# Patient Record
Sex: MALE | Race: WHITE | NOT HISPANIC OR LATINO | Employment: OTHER | URBAN - METROPOLITAN AREA
[De-identification: names, ages, dates, MRNs, and addresses within clinical notes are randomized per-mention and may not be internally consistent; named-entity substitution may affect disease eponyms.]

---

## 2023-09-02 ENCOUNTER — HOSPITAL ENCOUNTER (INPATIENT)
Facility: HOSPITAL | Age: 58
LOS: 3 days | Discharge: HOME | DRG: 392 | End: 2023-09-05
Attending: EMERGENCY MEDICINE | Admitting: SURGERY

## 2023-09-02 DIAGNOSIS — K57.92 DIVERTICULITIS: ICD-10-CM

## 2023-09-02 DIAGNOSIS — R10.9 ABDOMINAL PAIN: ICD-10-CM

## 2023-09-02 DIAGNOSIS — D72.829 LEUKOCYTOSIS, UNSPECIFIED TYPE: ICD-10-CM

## 2023-09-02 DIAGNOSIS — K57.80 PERFORATED DIVERTICULUM OF INTESTINE: Primary | ICD-10-CM

## 2023-09-02 DIAGNOSIS — N28.9 ACUTE RENAL INSUFFICIENCY: ICD-10-CM

## 2023-09-02 DIAGNOSIS — R73.9 HYPERGLYCEMIA: ICD-10-CM

## 2023-09-02 LAB
ALBUMIN SERPL-MCNC: 3.9 G/DL (ref 3.5–5)
ALBUMIN/GLOB SERPL: 1.3 RATIO (ref 1.1–2)
ALP SERPL-CCNC: 61 UNIT/L (ref 40–150)
ALT SERPL-CCNC: 22 UNIT/L (ref 0–55)
APPEARANCE UR: CLEAR
APTT PPP: 26.2 SECONDS (ref 23.2–33.7)
AST SERPL-CCNC: 23 UNIT/L (ref 5–34)
BACTERIA #/AREA URNS AUTO: ABNORMAL /HPF
BASOPHILS # BLD AUTO: 0.02 X10(3)/MCL
BASOPHILS NFR BLD AUTO: 0.1 %
BILIRUB SERPL-MCNC: 0.8 MG/DL
BILIRUB UR QL STRIP.AUTO: NEGATIVE
BUN SERPL-MCNC: 19.5 MG/DL (ref 8.4–25.7)
CALCIUM SERPL-MCNC: 9.8 MG/DL (ref 8.4–10.2)
CHLORIDE SERPL-SCNC: 108 MMOL/L (ref 98–107)
CO2 SERPL-SCNC: 25 MMOL/L (ref 22–29)
COLOR UR: ABNORMAL
CREAT SERPL-MCNC: 1.65 MG/DL (ref 0.73–1.18)
EOSINOPHIL # BLD AUTO: 0 X10(3)/MCL (ref 0–0.9)
EOSINOPHIL NFR BLD AUTO: 0 %
ERYTHROCYTE [DISTWIDTH] IN BLOOD BY AUTOMATED COUNT: 13.7 % (ref 11.5–17)
EST. AVERAGE GLUCOSE BLD GHB EST-MCNC: 122.6 MG/DL
GFR SERPLBLD CREATININE-BSD FMLA CKD-EPI: 48 MLS/MIN/1.73/M2
GLOBULIN SER-MCNC: 3 GM/DL (ref 2.4–3.5)
GLUCOSE SERPL-MCNC: 206 MG/DL (ref 74–100)
GLUCOSE UR QL STRIP.AUTO: NORMAL
HBA1C MFR BLD: 5.9 %
HCT VFR BLD AUTO: 43.1 % (ref 42–52)
HGB BLD-MCNC: 14.7 G/DL (ref 14–18)
HYALINE CASTS #/AREA URNS LPF: ABNORMAL /LPF
IMM GRANULOCYTES # BLD AUTO: 0.1 X10(3)/MCL (ref 0–0.04)
IMM GRANULOCYTES NFR BLD AUTO: 0.5 %
INR PPP: 1
KETONES UR QL STRIP.AUTO: NEGATIVE
LACTATE SERPL-SCNC: 1.7 MMOL/L (ref 0.5–2.2)
LACTATE SERPL-SCNC: 2.3 MMOL/L (ref 0.5–2.2)
LEUKOCYTE ESTERASE UR QL STRIP.AUTO: NEGATIVE
LIPASE SERPL-CCNC: 25 U/L
LYMPHOCYTES # BLD AUTO: 0.64 X10(3)/MCL (ref 0.6–4.6)
LYMPHOCYTES NFR BLD AUTO: 3.4 %
MCH RBC QN AUTO: 30.3 PG (ref 27–31)
MCHC RBC AUTO-ENTMCNC: 34.1 G/DL (ref 33–36)
MCV RBC AUTO: 88.9 FL (ref 80–94)
MONOCYTES # BLD AUTO: 0.63 X10(3)/MCL (ref 0.1–1.3)
MONOCYTES NFR BLD AUTO: 3.3 %
MUCOUS THREADS URNS QL MICRO: ABNORMAL /LPF
NEUTROPHILS # BLD AUTO: 17.43 X10(3)/MCL (ref 2.1–9.2)
NEUTROPHILS NFR BLD AUTO: 92.7 %
NITRITE UR QL STRIP.AUTO: NEGATIVE
NRBC BLD AUTO-RTO: 0 %
PH UR STRIP.AUTO: 5.5 [PH]
PLATELET # BLD AUTO: 253 X10(3)/MCL (ref 130–400)
PMV BLD AUTO: 9.7 FL (ref 7.4–10.4)
POTASSIUM SERPL-SCNC: 3.8 MMOL/L (ref 3.5–5.1)
PROT SERPL-MCNC: 6.9 GM/DL (ref 6.4–8.3)
PROT UR QL STRIP.AUTO: ABNORMAL
PROTHROMBIN TIME: 13.4 SECONDS (ref 11.4–14)
RBC # BLD AUTO: 4.85 X10(6)/MCL (ref 4.7–6.1)
RBC #/AREA URNS AUTO: ABNORMAL /HPF
RBC UR QL AUTO: NEGATIVE
SODIUM SERPL-SCNC: 141 MMOL/L (ref 136–145)
SP GR UR STRIP.AUTO: 1.03 (ref 1–1.03)
SQUAMOUS #/AREA URNS LPF: ABNORMAL /HPF
UROBILINOGEN UR STRIP-ACNC: NORMAL
WBC # SPEC AUTO: 18.82 X10(3)/MCL (ref 4.5–11.5)
WBC #/AREA URNS AUTO: ABNORMAL /HPF

## 2023-09-02 PROCEDURE — 85730 THROMBOPLASTIN TIME PARTIAL: CPT | Performed by: EMERGENCY MEDICINE

## 2023-09-02 PROCEDURE — 96361 HYDRATE IV INFUSION ADD-ON: CPT

## 2023-09-02 PROCEDURE — 96365 THER/PROPH/DIAG IV INF INIT: CPT

## 2023-09-02 PROCEDURE — 85025 COMPLETE CBC W/AUTO DIFF WBC: CPT | Performed by: EMERGENCY MEDICINE

## 2023-09-02 PROCEDURE — 93005 ELECTROCARDIOGRAM TRACING: CPT

## 2023-09-02 PROCEDURE — 80053 COMPREHEN METABOLIC PANEL: CPT | Performed by: EMERGENCY MEDICINE

## 2023-09-02 PROCEDURE — 96366 THER/PROPH/DIAG IV INF ADDON: CPT

## 2023-09-02 PROCEDURE — 63600175 PHARM REV CODE 636 W HCPCS: Performed by: INTERNAL MEDICINE

## 2023-09-02 PROCEDURE — 81001 URINALYSIS AUTO W/SCOPE: CPT | Performed by: EMERGENCY MEDICINE

## 2023-09-02 PROCEDURE — 83036 HEMOGLOBIN GLYCOSYLATED A1C: CPT | Performed by: EMERGENCY MEDICINE

## 2023-09-02 PROCEDURE — 83605 ASSAY OF LACTIC ACID: CPT | Performed by: EMERGENCY MEDICINE

## 2023-09-02 PROCEDURE — 25000003 PHARM REV CODE 250: Performed by: INTERNAL MEDICINE

## 2023-09-02 PROCEDURE — 21400001 HC TELEMETRY ROOM

## 2023-09-02 PROCEDURE — 99285 EMERGENCY DEPT VISIT HI MDM: CPT | Mod: 25

## 2023-09-02 PROCEDURE — 25500020 PHARM REV CODE 255: Performed by: INTERNAL MEDICINE

## 2023-09-02 PROCEDURE — 63600175 PHARM REV CODE 636 W HCPCS

## 2023-09-02 PROCEDURE — 63600175 PHARM REV CODE 636 W HCPCS: Performed by: STUDENT IN AN ORGANIZED HEALTH CARE EDUCATION/TRAINING PROGRAM

## 2023-09-02 PROCEDURE — 83690 ASSAY OF LIPASE: CPT | Performed by: EMERGENCY MEDICINE

## 2023-09-02 PROCEDURE — 25000003 PHARM REV CODE 250

## 2023-09-02 PROCEDURE — 63600175 PHARM REV CODE 636 W HCPCS: Performed by: EMERGENCY MEDICINE

## 2023-09-02 PROCEDURE — 87040 BLOOD CULTURE FOR BACTERIA: CPT | Performed by: INTERNAL MEDICINE

## 2023-09-02 PROCEDURE — 85610 PROTHROMBIN TIME: CPT | Performed by: EMERGENCY MEDICINE

## 2023-09-02 RX ORDER — METRONIDAZOLE 500 MG/100ML
500 INJECTION, SOLUTION INTRAVENOUS
Status: DISCONTINUED | OUTPATIENT
Start: 2023-09-02 | End: 2023-09-04

## 2023-09-02 RX ORDER — ACETAMINOPHEN 325 MG/1
650 TABLET ORAL EVERY 8 HOURS PRN
Status: DISCONTINUED | OUTPATIENT
Start: 2023-09-02 | End: 2023-09-05 | Stop reason: HOSPADM

## 2023-09-02 RX ORDER — CIPROFLOXACIN 2 MG/ML
400 INJECTION, SOLUTION INTRAVENOUS
Status: DISCONTINUED | OUTPATIENT
Start: 2023-09-02 | End: 2023-09-04

## 2023-09-02 RX ORDER — METOPROLOL SUCCINATE 25 MG/1
25 TABLET, EXTENDED RELEASE ORAL DAILY
COMMUNITY

## 2023-09-02 RX ORDER — ALLOPURINOL 300 MG/1
300 TABLET ORAL DAILY
COMMUNITY

## 2023-09-02 RX ORDER — MORPHINE SULFATE 2 MG/ML
2 INJECTION, SOLUTION INTRAMUSCULAR; INTRAVENOUS
Status: DISCONTINUED | OUTPATIENT
Start: 2023-09-02 | End: 2023-09-05 | Stop reason: HOSPADM

## 2023-09-02 RX ORDER — FLUOXETINE 20 MG/1
20 TABLET ORAL DAILY
Status: DISCONTINUED | OUTPATIENT
Start: 2023-09-02 | End: 2023-09-05 | Stop reason: HOSPADM

## 2023-09-02 RX ORDER — SODIUM CHLORIDE, SODIUM LACTATE, POTASSIUM CHLORIDE, CALCIUM CHLORIDE 600; 310; 30; 20 MG/100ML; MG/100ML; MG/100ML; MG/100ML
INJECTION, SOLUTION INTRAVENOUS CONTINUOUS
Status: DISCONTINUED | OUTPATIENT
Start: 2023-09-02 | End: 2023-09-05

## 2023-09-02 RX ORDER — FLUOXETINE 20 MG/1
20 TABLET ORAL DAILY
COMMUNITY

## 2023-09-02 RX ORDER — LISINOPRIL AND HYDROCHLOROTHIAZIDE 12.5; 2 MG/1; MG/1
1 TABLET ORAL DAILY
COMMUNITY

## 2023-09-02 RX ORDER — SODIUM CHLORIDE, SODIUM LACTATE, POTASSIUM CHLORIDE, CALCIUM CHLORIDE 600; 310; 30; 20 MG/100ML; MG/100ML; MG/100ML; MG/100ML
1000 INJECTION, SOLUTION INTRAVENOUS
Status: COMPLETED | OUTPATIENT
Start: 2023-09-02 | End: 2023-09-02

## 2023-09-02 RX ORDER — ACETAMINOPHEN 325 MG/1
650 TABLET ORAL EVERY 6 HOURS
Status: DISCONTINUED | OUTPATIENT
Start: 2023-09-02 | End: 2023-09-05 | Stop reason: HOSPADM

## 2023-09-02 RX ORDER — LIDOCAINE HYDROCHLORIDE 10 MG/ML
1 INJECTION, SOLUTION EPIDURAL; INFILTRATION; INTRACAUDAL; PERINEURAL ONCE AS NEEDED
Status: DISCONTINUED | OUTPATIENT
Start: 2023-09-02 | End: 2023-09-05 | Stop reason: HOSPADM

## 2023-09-02 RX ORDER — PROMETHAZINE HYDROCHLORIDE 25 MG/1
25 TABLET ORAL EVERY 6 HOURS PRN
Status: DISCONTINUED | OUTPATIENT
Start: 2023-09-02 | End: 2023-09-05 | Stop reason: HOSPADM

## 2023-09-02 RX ORDER — MELOXICAM 15 MG/1
15 TABLET ORAL DAILY
Status: ON HOLD | COMMUNITY
End: 2023-09-05 | Stop reason: HOSPADM

## 2023-09-02 RX ORDER — ACETAMINOPHEN 325 MG/1
650 TABLET ORAL EVERY 4 HOURS PRN
Status: DISCONTINUED | OUTPATIENT
Start: 2023-09-02 | End: 2023-09-02

## 2023-09-02 RX ORDER — ALLOPURINOL 100 MG/1
300 TABLET ORAL DAILY
Status: DISCONTINUED | OUTPATIENT
Start: 2023-09-02 | End: 2023-09-05 | Stop reason: HOSPADM

## 2023-09-02 RX ORDER — SIMVASTATIN 80 MG/1
80 TABLET, FILM COATED ORAL NIGHTLY
COMMUNITY
End: 2023-09-02

## 2023-09-02 RX ORDER — METOPROLOL SUCCINATE 25 MG/1
25 TABLET, EXTENDED RELEASE ORAL DAILY
Status: DISCONTINUED | OUTPATIENT
Start: 2023-09-02 | End: 2023-09-05 | Stop reason: HOSPADM

## 2023-09-02 RX ORDER — TALC
6 POWDER (GRAM) TOPICAL NIGHTLY PRN
Status: DISCONTINUED | OUTPATIENT
Start: 2023-09-02 | End: 2023-09-05 | Stop reason: HOSPADM

## 2023-09-02 RX ORDER — ENOXAPARIN SODIUM 100 MG/ML
40 INJECTION SUBCUTANEOUS EVERY 24 HOURS
Status: DISCONTINUED | OUTPATIENT
Start: 2023-09-02 | End: 2023-09-02

## 2023-09-02 RX ORDER — OXYCODONE HYDROCHLORIDE 5 MG/1
5 TABLET ORAL EVERY 4 HOURS PRN
Status: DISCONTINUED | OUTPATIENT
Start: 2023-09-02 | End: 2023-09-05 | Stop reason: HOSPADM

## 2023-09-02 RX ORDER — HEPARIN SODIUM 5000 [USP'U]/ML
5000 INJECTION, SOLUTION INTRAVENOUS; SUBCUTANEOUS EVERY 8 HOURS
Status: DISCONTINUED | OUTPATIENT
Start: 2023-09-02 | End: 2023-09-05 | Stop reason: HOSPADM

## 2023-09-02 RX ORDER — SODIUM CHLORIDE 0.9 % (FLUSH) 0.9 %
10 SYRINGE (ML) INJECTION
Status: DISCONTINUED | OUTPATIENT
Start: 2023-09-02 | End: 2023-09-05 | Stop reason: HOSPADM

## 2023-09-02 RX ORDER — ONDANSETRON 4 MG/1
8 TABLET, ORALLY DISINTEGRATING ORAL EVERY 8 HOURS PRN
Status: DISCONTINUED | OUTPATIENT
Start: 2023-09-02 | End: 2023-09-05 | Stop reason: HOSPADM

## 2023-09-02 RX ADMIN — ACETAMINOPHEN 650 MG: 325 TABLET, FILM COATED ORAL at 03:09

## 2023-09-02 RX ADMIN — SODIUM CHLORIDE, POTASSIUM CHLORIDE, SODIUM LACTATE AND CALCIUM CHLORIDE 1000 ML: 600; 310; 30; 20 INJECTION, SOLUTION INTRAVENOUS at 06:09

## 2023-09-02 RX ADMIN — SODIUM CHLORIDE, POTASSIUM CHLORIDE, SODIUM LACTATE AND CALCIUM CHLORIDE: 600; 310; 30; 20 INJECTION, SOLUTION INTRAVENOUS at 02:09

## 2023-09-02 RX ADMIN — OXYCODONE HYDROCHLORIDE 5 MG: 5 TABLET ORAL at 09:09

## 2023-09-02 RX ADMIN — METOPROLOL SUCCINATE 25 MG: 25 TABLET, EXTENDED RELEASE ORAL at 03:09

## 2023-09-02 RX ADMIN — ALLOPURINOL 300 MG: 100 TABLET ORAL at 03:09

## 2023-09-02 RX ADMIN — OXYCODONE HYDROCHLORIDE 5 MG: 5 TABLET ORAL at 04:09

## 2023-09-02 RX ADMIN — METRONIDAZOLE 500 MG: 5 INJECTION, SOLUTION INTRAVENOUS at 09:09

## 2023-09-02 RX ADMIN — FLUOXETINE 20 MG: 20 TABLET, FILM COATED ORAL at 05:09

## 2023-09-02 RX ADMIN — PIPERACILLIN AND TAZOBACTAM 4.5 G: 4; .5 INJECTION, POWDER, LYOPHILIZED, FOR SOLUTION INTRAVENOUS; PARENTERAL at 07:09

## 2023-09-02 RX ADMIN — METRONIDAZOLE 500 MG: 5 INJECTION, SOLUTION INTRAVENOUS at 03:09

## 2023-09-02 RX ADMIN — HEPARIN SODIUM 5000 UNITS: 5000 INJECTION, SOLUTION INTRAVENOUS; SUBCUTANEOUS at 09:09

## 2023-09-02 RX ADMIN — IOHEXOL 100 ML: 350 INJECTION, SOLUTION INTRAVENOUS at 07:09

## 2023-09-02 RX ADMIN — CIPROFLOXACIN 400 MG: 2 INJECTION, SOLUTION INTRAVENOUS at 05:09

## 2023-09-02 RX ADMIN — HEPARIN SODIUM 5000 UNITS: 5000 INJECTION, SOLUTION INTRAVENOUS; SUBCUTANEOUS at 03:09

## 2023-09-02 NOTE — PLAN OF CARE
Problem: Adult Inpatient Plan of Care  Goal: Plan of Care Review  9/2/2023 1846 by Maddison Francisco LPN  Outcome: Ongoing, Progressing  9/2/2023 1846 by Maddison Francisco LPN  Outcome: Ongoing, Progressing  Goal: Patient-Specific Goal (Individualized)  9/2/2023 1846 by Maddison Francisco LPN  Outcome: Ongoing, Progressing  9/2/2023 1846 by Maddison Francisco LPN  Outcome: Ongoing, Progressing  Goal: Absence of Hospital-Acquired Illness or Injury  9/2/2023 1846 by Maddison Francisco LPN  Outcome: Ongoing, Progressing  9/2/2023 1846 by Maddison Francisco LPN  Outcome: Ongoing, Progressing  Goal: Optimal Comfort and Wellbeing  9/2/2023 1846 by Maddison Francisco LPN  Outcome: Ongoing, Progressing  9/2/2023 1846 by Maddison Francisco LPN  Outcome: Ongoing, Progressing  Goal: Readiness for Transition of Care  9/2/2023 1846 by Maddison Francisco LPN  Outcome: Ongoing, Progressing  9/2/2023 1846 by Maddison Francisco LPN  Outcome: Ongoing, Progressing

## 2023-09-02 NOTE — ED PROVIDER NOTES
Encounter Date: 9/2/2023       History     Chief Complaint   Patient presents with    Abdominal Pain    Vomiting     He is here with wife for abdominal pain onset last night and persistent.  No prior such episode and no history of gastrointestinal problems or abdominal surgery.  Underlying history of pneumonia, hypertension, hyperlipidemia, and gout.  No known heart disease.  Has not had a colonoscopy.  Last p.o. intake about 6:00 a.m. last night.  Onset about 9:00 a.m. last night relatively sudden bilateral low midline abdominal pain, moderately intense, worse with certain movements.  It has been relatively constant all night with little change.  He had 1 episode of nausea and vomiting and passed stool at that time as well.  No diarrhea.  No fever, chills, sweats, or urinary complaints.  No sign of bleeding.  Smokes cigars but not cigarettes.   No other complaints    The history is provided by the spouse and the patient. No  was used.     Review of patient's allergies indicates:  No Known Allergies  History reviewed. No pertinent past medical history.  No past surgical history on file.  History reviewed. No pertinent family history.     Review of Systems   Constitutional:  Negative for chills and fever.   HENT:  Negative for congestion, facial swelling, nosebleeds and sinus pressure.    Eyes:  Negative for pain and redness.   Respiratory:  Negative for chest tightness, shortness of breath and wheezing.    Cardiovascular:  Negative for chest pain, palpitations and leg swelling.   Gastrointestinal:  Positive for abdominal pain, nausea and vomiting. Negative for abdominal distention and diarrhea.   Endocrine: Negative for cold intolerance, polydipsia and polyphagia.   Genitourinary:  Negative for difficulty urinating, dysuria, frequency and hematuria.   Musculoskeletal:  Negative for arthralgias, back pain, myalgias and neck pain.   Skin:  Negative for color change and rash.   Neurological:   Negative for dizziness, weakness, numbness and headaches.   Hematological:  Negative for adenopathy. Does not bruise/bleed easily.   Psychiatric/Behavioral:  Negative for agitation and behavioral problems.    All other systems reviewed and are negative.      Physical Exam     Initial Vitals [09/02/23 0534]   BP Pulse Resp Temp SpO2   115/77 104 20 98.6 °F (37 °C) 96 %      MAP       --         Physical Exam    Nursing note and vitals reviewed.  Constitutional: He appears well-developed and well-nourished. He is not diaphoretic. He appears distressed.   Mild discomfort; moderately obese   HENT:   Head: Normocephalic and atraumatic.   Mouth/Throat: Oropharynx is clear and moist. No oropharyngeal exudate.   Eyes: Conjunctivae and EOM are normal. Pupils are equal, round, and reactive to light. Right eye exhibits no discharge. Left eye exhibits no discharge. No scleral icterus.   Neck: Neck supple. No thyromegaly present. No tracheal deviation present. No JVD present.   Normal range of motion.  Cardiovascular:  Normal rate, regular rhythm and normal heart sounds.     Exam reveals no gallop and no friction rub.       No murmur heard.  Pulmonary/Chest: Breath sounds normal. No respiratory distress. He has no wheezes. He has no rhonchi. He has no rales. He exhibits no tenderness.   Abdominal: Abdomen is soft. Bowel sounds are normal. He exhibits no distension and no mass. There is abdominal tenderness.   Quiet bowel sounds.  Tender low midline abdominal exam with mild rebound to that region.  Moderate obesity limits exam but no definite distention.  Unable to assess adequately for organomegaly. There is rebound. There is no guarding.   Musculoskeletal:         General: No tenderness or edema. Normal range of motion.      Cervical back: Normal range of motion and neck supple.     Lymphadenopathy:     He has no cervical adenopathy.   Neurological: He is alert and oriented to person, place, and time. He has normal strength. No  cranial nerve deficit.   Skin: Skin is warm and dry. No rash noted. No erythema.   Psychiatric: He has a normal mood and affect. His behavior is normal. Judgment and thought content normal.         ED Course   Procedures  Labs Reviewed   COMPREHENSIVE METABOLIC PANEL - Abnormal; Notable for the following components:       Result Value    Chloride 108 (*)     Glucose Level 206 (*)     Creatinine 1.65 (*)     All other components within normal limits   URINALYSIS, REFLEX TO URINE CULTURE - Abnormal; Notable for the following components:    Specific Gravity, UA 1.032 (*)     Protein, UA Trace (*)     Squamous Epithelial Cells, UA Trace (*)     Mucous, UA Trace (*)     Hyaline Casts, UA 0-2 (*)     All other components within normal limits   CBC WITH DIFFERENTIAL - Abnormal; Notable for the following components:    WBC 18.82 (*)     Neut # 17.43 (*)     IG# 0.10 (*)     All other components within normal limits   LACTIC ACID, PLASMA - Abnormal; Notable for the following components:    Lactic Acid Level 2.3 (*)     All other components within normal limits   LIPASE - Normal   PROTIME-INR - Normal   APTT - Normal   BLOOD CULTURE OLG   BLOOD CULTURE OLG   CBC W/ AUTO DIFFERENTIAL    Narrative:     The following orders were created for panel order CBC W/ AUTO DIFFERENTIAL.  Procedure                               Abnormality         Status                     ---------                               -----------         ------                     CBC with Differential[[id number]]        Abnormal            Final result                 Please view results for these tests on the individual orders.   HEMOGLOBIN A1C   EXTRA TUBES    Narrative:     The following orders were created for panel order EXTRA TUBES.  Procedure                               Abnormality         Status                     ---------                               -----------         ------                     Light Green Top Hold[[id number]]                              In process                 Lavender Top Hold[[id number]]                                In process                 Gold Top Hold[[id number]]                                    In process                   Please view results for these tests on the individual orders.   LIGHT GREEN TOP HOLD   LAVENDER TOP HOLD   GOLD TOP HOLD   EXTRA TUBES    Narrative:     The following orders were created for panel order EXTRA TUBES.  Procedure                               Abnormality         Status                     ---------                               -----------         ------                     Red Top Hold[[id number]]                                     In process                   Please view results for these tests on the individual orders.   RED TOP HOLD   LACTIC ACID, PLASMA     EKG Readings: (Independently Interpreted)   Initial Reading: No STEMI. Rhythm: Normal Sinus Rhythm. Heart Rate: 93. Ectopy: No Ectopy. Conduction: Normal. ST Segments: Normal ST Segments. T Waves: Normal. Axis: Normal. Clinical Impression: Normal Sinus Rhythm       Imaging Results              X-Ray Chest PA And Lateral (In process)                      CT Abdomen Pelvis With Contrast (Preliminary result)  Result time 09/02/23 07:04:26      Preliminary result by Wilton Lassiter MD (09/02/23 07:04:26)                   Narrative:    START OF REPORT:  Technique: CT of the abdomen and pelvis was performed with axial images as well as sagittal and coronal reconstruction images with intravenous contrast.    Comparison: None available.    Clinical History: Abdominal abscess/infection suspected.    Dosage Information: Automated Exposure Control was utilized 551.60 mGy.cm.    Findings:  Lines and Tubes: None.  Thorax:  Lungs: There is minimal nonspecific dependent change at the lung bases. No focal infiltrate or consolidation is seen.  Heart: The heart size is within normal limits.  Abdomen:  Abdominal Wall: No abdominal wall pathology is  seen.  Liver: The liver appears unremarkable.  Biliary System: No intrahepatic or extrahepatic biliary duct dilatation is seen.  Gallbladder: The gallbladder appears unremarkable.  Pancreas: The pancreas appears unremarkable.  Spleen: There is a small subtle hypodense focus in the inferior pole of the spleen (Series 2, Image 76). Correlate with clinical and laboratory findings as regards additional evaluation and follow-up.  Adrenals: The adrenal glands appear unremarkable.  Kidneys: There are several cysts seen in the lower pole of the right kidney and upper pole of the left kidney. The kidneys otherwise appear unremarkable with no stones or masses or hydronephrosis.  Aorta: There is minimal calcification of the abdominal aorta.  Bowel:  Esophagus: The visualized esophagus appears unremarkable.  Stomach: The stomach appears unremarkable.  Duodenum: Unremarkable appearing duodenum.  Small Bowel: The small bowel appears unremarkable.  Colon: There are a few scattered diverticula in the colon. There is focal inflammation of the sigmoid colon centered around image 139 series 2 with associated extraluminal gas centered around image 140 series 2. These findings are consistent with perforated diverticulitis with tiny scattered extraluminal gas locules in the anti dependent peritoneum including image 128 series 2 as well as image 111 series 2 anterior midline peritoneal fat. No associated abscess is identified.  Appendix: The appendix appears unremarkable and is seen on Image 120, Series 2.    Pelvis:  Bladder: The bladder appears unremarkable.    Bony structures:  Dorsal Spine: There is pronounced multilevel spondylosis of the visualized dorsal spine.  Bony Pelvis: There is pronounced degenerative change of the hip.    Notifications: The results were discussed with the emergency room physician (Dr Rosa) prior to dictation at 2023-09-02 07:50:20 CDT.      Impression:  1. There are a few scattered diverticula in the  colon. There is focal inflammation of the sigmoid colon centered around image 139 series 2 with associated extraluminal gas centered around image 140 series 2. These findings are consistent with perforated diverticulitis with tiny scattered extraluminal gas locules in the anti dependent peritoneum including image 128 series 2 as well as image 111 series 2 anterior midline peritoneal fat. No associated abscess is identified. Correlate with clinical and laboratory findings and recommend additional evaluation and follow-up as indicated.  2. Details and other findings as discussed above.                                         Medications   piperacillin-tazobactam (ZOSYN) 4.5 g in dextrose 5 % in water (D5W) 100 mL IVPB (MB+) (4.5 g Intravenous New Bag 9/2/23 0745)   lactated ringers infusion (1,000 mLs Intravenous New Bag 9/2/23 0616)   lactated ringers bolus 1,000 mL (1,000 mLs Intravenous New Bag 9/2/23 0655)   iohexoL (OMNIPAQUE 350) injection 100 mL (100 mLs Intravenous Given 9/2/23 0706)     Medical Decision Making  Amount and/or Complexity of Data Reviewed  Labs: ordered. Decision-making details documented in ED Course.  Radiology: ordered and independent interpretation performed. Decision-making details documented in ED Course.     Details: Diverticulitis with perforation, no abscess  ECG/medicine tests: ordered and independent interpretation performed. Decision-making details documented in ED Course.  Discussion of management or test interpretation with external provider(s): CT discussed with Radiologist.    8:04 AM Consult: I discussed the case with Dr. Castro (Surgery). Agrees with current management.   Recommends will evaluate in ED      Risk  Prescription drug management.  Decision regarding hospitalization.                               Clinical Impression:   Final diagnoses:  [R10.9] Abdominal pain  [R73.9] Hyperglycemia (Primary)  [N28.9] Acute renal insufficiency  [D72.829] Leukocytosis, unspecified  type  [K57.92] Diverticulitis  [K57.80] Perforated diverticulum of intestine        ED Disposition Condition    Observation Stable                Jeanmarie Hampton MD  09/02/23 0854

## 2023-09-02 NOTE — PSYCH EVALUATION
[facility]/General Surgery  History & Physical  Chief Complaint:   LLQ abdominal pain beginning 9 pm on 09/01/23.     History of Present Illness:  Jaron Downing is a 58 y.o. male with PMHx of HTN, Cigar usage, who presented to the ED on 09/02/23 with acute abdominal pain, beginning 8 hours ago, which started in the RLQ before shifting to it's current location in the L lower quadrant region. Pt. states he vomited once at midnight and his emesis contained a piece of stool. He has no nausea, fever, or chills. He is passing gas today and can not recall his last bowel movement. He denies chest pain, dyspnea, pain on urination, or ever receiving a colonoscopy.    Socially the patient is a retired . He has a family history of lung cancer in both an aunt and uncle. Denies alcohol use, previous surgeries, heart or lung problems.     Review of Systems:  Negative other than stated in HPI on 10 point review of systems.     Allergies:  Review of patient's allergies indicates:  No Known Allergies    Home Medications:  No current facility-administered medications on file prior to encounter.     Current Outpatient Medications on File Prior to Encounter   Medication Sig    allopurinoL (ZYLOPRIM) 300 MG tablet Take 300 mg by mouth once daily.    FLUoxetine 20 MG tablet Take 20 mg by mouth once daily.    lisinopriL-hydrochlorothiazide (PRINZIDE,ZESTORETIC) 20-12.5 mg per tablet Take 1 tablet by mouth once daily.    meloxicam (MOBIC) 15 MG tablet Take 15 mg by mouth once daily.    metoprolol succinate (TOPROL-XL) 25 MG 24 hr tablet Take 25 mg by mouth once daily.    [DISCONTINUED] simvastatin (ZOCOR) 80 MG tablet Take 80 mg by mouth every evening.       Past Medical History:  Well controlled hypertension.    Past Surgical History:  No past surgical history on file.    Family History:   Lung cancer in both an unspecified aunt and uncle    Social History:     Retired . Smokes cigars. Son is also a .      Vital Signs:  Temp: 98.6 °F (37 °C) (09/02/23 0534)  Pulse: 90 (09/02/23 1200)  Resp: 15 (09/02/23 1200)  BP: 128/71 (09/02/23 1200)  SpO2: 96 % (09/02/23 1200)    Physical Exam:  General: well developed, well nourished, no distress  Abdomen: Distended, soft, focal tenderness in LLQ and suprapubic region with voluntary guarding, no present surgical scars.   Skin: Skin color, texture, turgor normal. No rashes or lesions      Laboratory:  Recent Labs     09/02/23  0602   WBC 18.82*   HGB 14.7   HCT 43.1         K 3.8   CO2 25   BUN 19.5   CREATININE 1.65*   BILITOT 0.8   AST 23   ALT 22   ALKPHOS 61   CALCIUM 9.8   ALBUMIN 3.9       Diagnostic Results:  CT abdomen Pelvis with Contrast (09/2/23 @7:04 am)   Impression per radiology:   Focal wall thickening of the sigmoid colon with surrounding inflammatory changes and trace free air.  Findings may represent an acute diverticulitis with perforation.  No drainable fluid collection.  Follow-up needed with endoscopy.     CXR AP and Lat (09/2/23)  Impression:  No acute abnormality of the chest.    ASSESSMENT:     Mr. Jaron Downing is a 59 y/o male with PMH of well-controlled HTN who presents with acute diverticulitis with microperforation.     PLAN:   - Prophylactic antibiotics  - NPO  - Strict I/O's  - Serial abdominal exams   Latrell Xie MD  [facility] General Surgery PGY-1  1:21 PM

## 2023-09-02 NOTE — H&P
[facility]/General Surgery  History & Physical  Chief Complaint:   LLQ abdominal pain beginning 9 pm on 09/01/23.     History of Present Illness:  Jaron Downing is a 58 y.o. male with PMHx of HTN, Cigar usage, who presented to the ED on 09/02/23 with acute abdominal pain, beginning 8 hours prior to arrival, which started in the RLQ before shifting to it's current location in the L lower quadrant region. He has never had pain like this before. Pt. states he vomited once at midnight. He has no nausea since then, no fever, or chills. He is passing gas today and can not recall his last bowel movement. He denies chest pain, dyspnea, pain on urination, bloody stools, diarrhea. He has never undergone colonoscopy.     Socially the patient is a retired . He has a family history of lung cancer in both an aunt and uncle.     Review of Systems:  Negative other than stated in HPI on 10 point review of systems.     Allergies:  Review of patient's allergies indicates:  No Known Allergies    Home Medications:  No current facility-administered medications on file prior to encounter.     Current Outpatient Medications on File Prior to Encounter   Medication Sig    allopurinoL (ZYLOPRIM) 300 MG tablet Take 300 mg by mouth once daily.    FLUoxetine 20 MG tablet Take 20 mg by mouth once daily.    lisinopriL-hydrochlorothiazide (PRINZIDE,ZESTORETIC) 20-12.5 mg per tablet Take 1 tablet by mouth once daily.    meloxicam (MOBIC) 15 MG tablet Take 15 mg by mouth once daily.    metoprolol succinate (TOPROL-XL) 25 MG 24 hr tablet Take 25 mg by mouth once daily.    [DISCONTINUED] simvastatin (ZOCOR) 80 MG tablet Take 80 mg by mouth every evening.     Past Medical History:  HTN  HLD  gout    Past Surgical History:  No previous surgeries    Family History:   Lung cancer in both an unspecified aunt and uncle    Social History:   Retired . Smokes cigars. Son is also a .   Has not had alcohol for two years  No  recreational drug use    Vital Signs:  Temp: 98.6 °F (37 °C) (09/02/23 0534)  Pulse: 90 (09/02/23 1200)  Resp: 15 (09/02/23 1200)  BP: 128/71 (09/02/23 1200)  SpO2: 96 % (09/02/23 1200)    Physical Exam:  General: well developed, well nourished, no distress  Pulm: speaking full sentences, no RD  CV: RR  Abdomen: mildly distended, soft, with focal tenderness in LLQ and suprapubic region with voluntary guarding, no present surgical scars.   Skin: Skin color, texture, turgor normal. No rashes or lesions  MSK: moving all extremities     Laboratory:  WBC/Hgb/Hct/Plts:  18.82/14.7/43.1/253 (09/02 0602).  BUN/Cr/glu/ALT/AST/amyl/lip:  19.5/1.65/--/22/23/--/25 (09/02 0602)  Na/K/Cl/CO2:  141/3.8/108/25 (09/02 0602)     Diagnostic Results:  CT abdomen Pelvis with Contrast (09/2/23 @7:04 am)   Impression per radiology:   Focal wall thickening of the sigmoid colon with surrounding inflammatory changes and trace free air. Findings may represent an acute diverticulitis with perforation.  No drainable fluid collection. Follow-up needed with endoscopy.     CXR AP and Lat (09/2/23)  Impression:  No acute abnormality of the chest.    ASSESSMENT:     Mr. Jaron Downing is a 59 y/o male here with first episode of acute diverticulitis with contained microperforation. Focally tender, but not peritonitic. Leukocytosis to 18. Afebrile, HDS. Warranting admission to trial nonoperative management. Discussed this with patient and that if successful medical management will have him obtain colonoscopy in 6-8 weeks and General Surgery clinic follow-up to discuss sigmoidectomy. Also discussed with him that if medical management unsuccessful, he would likely require more urgent surgery and colostomy.     PLAN:     - Will admit to General Surgery   - NPO, ok for ice chips  - IV cipro/flagyl  - IVF  - MMPC  - PRN antiemetics  - I&Os  - Restart home meds  - OOB to chair, ad doug activity  - Lovenox 40 daily     Latrell Xie MD  [facility] General  Surgery HO-1  1:57 PM

## 2023-09-03 LAB
ANION GAP SERPL CALC-SCNC: 8 MEQ/L
BUN SERPL-MCNC: 13.4 MG/DL (ref 8.4–25.7)
CALCIUM SERPL-MCNC: 9.2 MG/DL (ref 8.4–10.2)
CHLORIDE SERPL-SCNC: 106 MMOL/L (ref 98–107)
CO2 SERPL-SCNC: 27 MMOL/L (ref 22–29)
CREAT SERPL-MCNC: 1.25 MG/DL (ref 0.73–1.18)
CREAT/UREA NIT SERPL: 11
ERYTHROCYTE [DISTWIDTH] IN BLOOD BY AUTOMATED COUNT: 13.9 % (ref 11.5–17)
GFR SERPLBLD CREATININE-BSD FMLA CKD-EPI: >60 MLS/MIN/1.73/M2
GLUCOSE SERPL-MCNC: 118 MG/DL (ref 74–100)
HCT VFR BLD AUTO: 39.7 % (ref 42–52)
HGB BLD-MCNC: 12.9 G/DL (ref 14–18)
MAGNESIUM SERPL-MCNC: 1.7 MG/DL (ref 1.6–2.6)
MCH RBC QN AUTO: 29 PG (ref 27–31)
MCHC RBC AUTO-ENTMCNC: 32.5 G/DL (ref 33–36)
MCV RBC AUTO: 89.2 FL (ref 80–94)
NRBC BLD AUTO-RTO: 0 %
PHOSPHATE SERPL-MCNC: 2.3 MG/DL (ref 2.3–4.7)
PLATELET # BLD AUTO: 206 X10(3)/MCL (ref 130–400)
PMV BLD AUTO: 10 FL (ref 7.4–10.4)
POTASSIUM SERPL-SCNC: 3.4 MMOL/L (ref 3.5–5.1)
RBC # BLD AUTO: 4.45 X10(6)/MCL (ref 4.7–6.1)
SODIUM SERPL-SCNC: 141 MMOL/L (ref 136–145)
WBC # SPEC AUTO: 17.23 X10(3)/MCL (ref 4.5–11.5)

## 2023-09-03 PROCEDURE — 63600175 PHARM REV CODE 636 W HCPCS: Performed by: STUDENT IN AN ORGANIZED HEALTH CARE EDUCATION/TRAINING PROGRAM

## 2023-09-03 PROCEDURE — 63600175 PHARM REV CODE 636 W HCPCS

## 2023-09-03 PROCEDURE — 83735 ASSAY OF MAGNESIUM: CPT

## 2023-09-03 PROCEDURE — 94761 N-INVAS EAR/PLS OXIMETRY MLT: CPT

## 2023-09-03 PROCEDURE — 21400001 HC TELEMETRY ROOM

## 2023-09-03 PROCEDURE — 85027 COMPLETE CBC AUTOMATED: CPT

## 2023-09-03 PROCEDURE — 80048 BASIC METABOLIC PNL TOTAL CA: CPT

## 2023-09-03 PROCEDURE — 25000003 PHARM REV CODE 250

## 2023-09-03 PROCEDURE — 84100 ASSAY OF PHOSPHORUS: CPT

## 2023-09-03 RX ORDER — MAGNESIUM SULFATE 1 G/100ML
1 INJECTION INTRAVENOUS ONCE
Status: COMPLETED | OUTPATIENT
Start: 2023-09-03 | End: 2023-09-03

## 2023-09-03 RX ORDER — MAGNESIUM SULFATE HEPTAHYDRATE 40 MG/ML
2 INJECTION, SOLUTION INTRAVENOUS ONCE
Status: COMPLETED | OUTPATIENT
Start: 2023-09-03 | End: 2023-09-03

## 2023-09-03 RX ADMIN — HEPARIN SODIUM 5000 UNITS: 5000 INJECTION, SOLUTION INTRAVENOUS; SUBCUTANEOUS at 06:09

## 2023-09-03 RX ADMIN — CIPROFLOXACIN 400 MG: 2 INJECTION, SOLUTION INTRAVENOUS at 04:09

## 2023-09-03 RX ADMIN — Medication 6 MG: at 09:09

## 2023-09-03 RX ADMIN — FLUOXETINE 20 MG: 20 TABLET, FILM COATED ORAL at 02:09

## 2023-09-03 RX ADMIN — OXYCODONE HYDROCHLORIDE 5 MG: 5 TABLET ORAL at 07:09

## 2023-09-03 RX ADMIN — ACETAMINOPHEN 650 MG: 325 TABLET, FILM COATED ORAL at 05:09

## 2023-09-03 RX ADMIN — METRONIDAZOLE 500 MG: 5 INJECTION, SOLUTION INTRAVENOUS at 06:09

## 2023-09-03 RX ADMIN — ACETAMINOPHEN 650 MG: 325 TABLET, FILM COATED ORAL at 12:09

## 2023-09-03 RX ADMIN — SODIUM CHLORIDE, POTASSIUM CHLORIDE, SODIUM LACTATE AND CALCIUM CHLORIDE: 600; 310; 30; 20 INJECTION, SOLUTION INTRAVENOUS at 01:09

## 2023-09-03 RX ADMIN — MAGNESIUM SULFATE HEPTAHYDRATE 2 G: 40 INJECTION, SOLUTION INTRAVENOUS at 02:09

## 2023-09-03 RX ADMIN — METRONIDAZOLE 500 MG: 5 INJECTION, SOLUTION INTRAVENOUS at 02:09

## 2023-09-03 RX ADMIN — CIPROFLOXACIN 400 MG: 2 INJECTION, SOLUTION INTRAVENOUS at 05:09

## 2023-09-03 RX ADMIN — OXYCODONE HYDROCHLORIDE 5 MG: 5 TABLET ORAL at 02:09

## 2023-09-03 RX ADMIN — METOPROLOL SUCCINATE 25 MG: 25 TABLET, EXTENDED RELEASE ORAL at 08:09

## 2023-09-03 RX ADMIN — SODIUM CHLORIDE, POTASSIUM CHLORIDE, SODIUM LACTATE AND CALCIUM CHLORIDE: 600; 310; 30; 20 INJECTION, SOLUTION INTRAVENOUS at 10:09

## 2023-09-03 RX ADMIN — ACETAMINOPHEN 650 MG: 325 TABLET, FILM COATED ORAL at 11:09

## 2023-09-03 RX ADMIN — MAGNESIUM SULFATE IN DEXTROSE 1 G: 10 INJECTION, SOLUTION INTRAVENOUS at 06:09

## 2023-09-03 RX ADMIN — HEPARIN SODIUM 5000 UNITS: 5000 INJECTION, SOLUTION INTRAVENOUS; SUBCUTANEOUS at 02:09

## 2023-09-03 RX ADMIN — METRONIDAZOLE 500 MG: 5 INJECTION, SOLUTION INTRAVENOUS at 10:09

## 2023-09-03 RX ADMIN — OXYCODONE HYDROCHLORIDE 5 MG: 5 TABLET ORAL at 10:09

## 2023-09-03 RX ADMIN — SODIUM CHLORIDE, POTASSIUM CHLORIDE, SODIUM LACTATE AND CALCIUM CHLORIDE: 600; 310; 30; 20 INJECTION, SOLUTION INTRAVENOUS at 02:09

## 2023-09-03 RX ADMIN — HEPARIN SODIUM 5000 UNITS: 5000 INJECTION, SOLUTION INTRAVENOUS; SUBCUTANEOUS at 09:09

## 2023-09-03 RX ADMIN — POTASSIUM PHOSPHATE, MONOBASIC AND POTASSIUM PHOSPHATE, DIBASIC 30 MMOL: 224; 236 INJECTION, SOLUTION, CONCENTRATE INTRAVENOUS at 08:09

## 2023-09-03 RX ADMIN — ALLOPURINOL 300 MG: 100 TABLET ORAL at 08:09

## 2023-09-03 NOTE — PROGRESS NOTES
"Acute Care and General Surgery   Progress Note  Admit Date: 9/2/2023  HD#1  POD#* No surgery found *    Subjective:   Interval history:  No acute overnight events.  Abdominal pain improved but still minimally persistent especially in the LLQ  1 Bowel movement last night with no visible blood or other abnormality.  Urinated 4x last night  No n/v chills, fever, diarrhea, or constipation  OOB, ambulating to restroom.  Can move about his room and bed with minimal to no pain.     Home Meds:  Current Outpatient Medications   Medication Instructions    allopurinoL (ZYLOPRIM) 300 mg, Oral, Daily    FLUoxetine 20 mg, Oral, Daily    lisinopriL-hydrochlorothiazide (PRINZIDE,ZESTORETIC) 20-12.5 mg per tablet 1 tablet, Oral, Daily    meloxicam (MOBIC) 15 mg, Oral, Daily    metoprolol succinate (TOPROL-XL) 25 mg, Oral, Daily      Scheduled Meds:   acetaminophen  650 mg Oral Q6H    allopurinoL  300 mg Oral Daily    ciprofloxacin  400 mg Intravenous Q12H    FLUoxetine  20 mg Oral Daily    heparin (porcine)  5,000 Units Subcutaneous Q8H    magnesium sulfate IVPB  2 g Intravenous Once    metoprolol succinate  25 mg Oral Daily    metronidazole  500 mg Intravenous Q8H    potassium phosphate IVPB  30 mmol Intravenous Once     Continuous Infusions:   lactated ringers Stopped (09/03/23 0814)     PRN Meds:acetaminophen, LIDOcaine (PF) 10 mg/ml (1%), melatonin, morphine, ondansetron, oxyCODONE, promethazine, sodium chloride 0.9%     Objective:     VITAL SIGNS: 24 HR MIN & MAX LAST   Temp  Min: 98.4 °F (36.9 °C)  Max: 100.5 °F (38.1 °C)  98.4 °F (36.9 °C)   BP  Min: 119/83  Max: 155/90  136/73    Pulse  Min: 77  Max: 95  83    Resp  Min: 14  Max: 21  18    SpO2  Min: 95 %  Max: 98 %  95 %      HT: 5' 6" (167.6 cm)  WT: 105.2 kg (232 lb)  BMI: 37.5     Intake/output:  Intake/Output - Last 3 Shifts         09/01 0700  09/02 0659 09/02 0700 09/03 0659 09/03 0700 09/04 0659    I.V. (mL/kg)  1310.3 (12.5) 223 (2.1)    IV Piggyback  1749.8 48.9 "    Total Intake(mL/kg)  3060.1 (29.1) 271.9 (2.6)    Net  +3060.1 +271.9           Stool Occurrence  1 x             Intake/Output Summary (Last 24 hours) at 9/3/2023 0939  Last data filed at 9/3/2023 0821  Gross per 24 hour   Intake 2331.99 ml   Output --   Net 2331.99 ml           Lines/drains/airway:       Peripheral IV - Single Lumen 09/02/23 0605 18 G Posterior;Right Hand (Active)   Site Assessment Clean;Dry;Intact;No redness;No swelling 09/03/23 0400   Extremity Assessment Distal to IV No warmth;No swelling;No redness;No abnormal discoloration 09/02/23 2000   Line Status Infusing 09/03/23 0400   Dressing Status Clean;Dry;Intact 09/03/23 0400   Dressing Intervention Integrity maintained 09/03/23 0400   Number of days: 1       Physical examination:  Gen: NAD, AAOx3, answering questions appropriately  HEENT:  CV: RR  Resp: NWOB  Abd: Mildly distended, TTP in LLQ and diffusely throughout abdomen. No surgical scars noted.   Ext: moving all extremities spontaneously and purposefully  Neuro: CN II-XII grossly intact  Skin/wounds: No visual abnormalities.     Labs:  WBC/Hgb/Hct/Plts:  17.23/12.9/39.7/206 (09/03 0325)  Na/K/Cl/CO2:  141/3.4/106/27 (09/03 0325)  BUN/Cr/glu/ALT/AST/amyl/lip:  13.4/1.25/--/--/--/--/-- (09/03 0325)     Imaging:  CT abdomen Pelvis with Contrast (09/2/23 @7:04 am)   Impression per radiology:   Focal wall thickening of the sigmoid colon with surrounding inflammatory changes and trace free air. Findings may represent an acute diverticulitis with perforation.  No drainable fluid collection. Follow-up needed with endoscopy.     CXR AP and Lat (09/2/23)  Impression:  No acute abnormality of the chest    Assessment & Plan:   Jaron Downing is a 58 y.o. male being treated for his first episode of acute diverticulitis with contained microperforation. He is focally tender, but not peritonitic. WBC count 17.23. Cr 1.25. Afebrile, HDS. Warranting continued hospitalization and medical management to resolve  microperforation non-operatively. If successful we will have the patient follow-up with colonoscopy in 6-8 weeks and also with our General Surgery clinic to discuss sigmoidectomy. If medical management is unsuccessful patient will likely require an urgent surgery and colostomy. Both plans discussed with patient in depth. Today, he is markedly improved with improved tenderness on exam. HDS. Continue non-operative management.     - Continue NPO, ok for ice chips  - IV cipro/flagyl  - IVF  - I's & O's  - MMPC  - PRN antiemetics  - OOB to chair, ad doug activity  - home allopurinol, fluoxetine, metoprolol   -  Lovenox 40 daily    Latrell Xie MD  [facility] General Surgery HO-1  9:11 AM

## 2023-09-03 NOTE — MEDICAL/APP STUDENT
"Acute Care and General Surgery   Progress Note  Admit Date: 9/2/2023  HD#1  POD#* No surgery found *    Subjective:   Interval history:  No acute overnight events.  Abdominal pain improved but still minimally persistent especially in the LLQ  1 Bowel movement last night with no visible blood or other abnormality.  Urinated 4x last night  No n/v chills, fever, diarrhea, or constipation  OOB, ambulating to restroom.  Can move about his room and bed with minimal to no pain.     Home Meds:  Current Outpatient Medications   Medication Instructions    allopurinoL (ZYLOPRIM) 300 mg, Oral, Daily    FLUoxetine 20 mg, Oral, Daily    lisinopriL-hydrochlorothiazide (PRINZIDE,ZESTORETIC) 20-12.5 mg per tablet 1 tablet, Oral, Daily    meloxicam (MOBIC) 15 mg, Oral, Daily    metoprolol succinate (TOPROL-XL) 25 mg, Oral, Daily      Scheduled Meds:   acetaminophen  650 mg Oral Q6H    allopurinoL  300 mg Oral Daily    ciprofloxacin  400 mg Intravenous Q12H    FLUoxetine  20 mg Oral Daily    heparin (porcine)  5,000 Units Subcutaneous Q8H    magnesium sulfate IVPB  2 g Intravenous Once    metoprolol succinate  25 mg Oral Daily    metronidazole  500 mg Intravenous Q8H    potassium phosphate IVPB  30 mmol Intravenous Once     Continuous Infusions:   lactated ringers Stopped (09/03/23 0814)     PRN Meds:acetaminophen, LIDOcaine (PF) 10 mg/ml (1%), melatonin, morphine, ondansetron, oxyCODONE, promethazine, sodium chloride 0.9%     Objective:     VITAL SIGNS: 24 HR MIN & MAX LAST   Temp  Min: 98.4 °F (36.9 °C)  Max: 100.5 °F (38.1 °C)  98.4 °F (36.9 °C)   BP  Min: 119/83  Max: 155/90  136/73    Pulse  Min: 77  Max: 95  83    Resp  Min: 14  Max: 21  18    SpO2  Min: 95 %  Max: 98 %  95 %      HT: 5' 6" (167.6 cm)  WT: 105.2 kg (232 lb)  BMI: 37.5     Intake/output:  Intake/Output - Last 3 Shifts         09/01 0700  09/02 0659 09/02 0700 09/03 0659 09/03 0700 09/04 0659    I.V. (mL/kg)  1310.3 (12.5) 223 (2.1)    IV Piggyback  1749.8 48.9 "    Total Intake(mL/kg)  3060.1 (29.1) 271.9 (2.6)    Net  +3060.1 +271.9           Stool Occurrence  1 x             Intake/Output Summary (Last 24 hours) at 9/3/2023 0911  Last data filed at 9/3/2023 0821  Gross per 24 hour   Intake 2331.99 ml   Output --   Net 2331.99 ml         Lines/drains/airway:       Peripheral IV - Single Lumen 09/02/23 0605 18 G Posterior;Right Hand (Active)   Site Assessment Clean;Dry;Intact;No redness;No swelling 09/03/23 0400   Extremity Assessment Distal to IV No warmth;No swelling;No redness;No abnormal discoloration 09/02/23 2000   Line Status Infusing 09/03/23 0400   Dressing Status Clean;Dry;Intact 09/03/23 0400   Dressing Intervention Integrity maintained 09/03/23 0400   Number of days: 1       Physical examination:  Gen: NAD, AAOx3, answering questions appropriately  HEENT:  CV: RR  Resp: NWOB  Abd: Mildly distended, TTP in LLQ and diffusely throughout abdomen. No scars or tattoos noted.   Ext: moving all extremities spontaneously and purposefully  Neuro: CN II-XII grossly intact  Skin/wounds: No visual abnormalities.     Labs:  WBC/Hgb/Hct/Plts:  17.23/12.9/39.7/206 (09/03 0325)  Na/K/Cl/CO2:  141/3.4/106/27 (09/03 0325)  BUN/Cr/glu/ALT/AST/amyl/lip:  13.4/1.25/--/--/--/--/-- (09/03 0325)     Imaging:  CT abdomen Pelvis with Contrast (09/2/23 @7:04 am)   Impression per radiology:   Focal wall thickening of the sigmoid colon with surrounding inflammatory changes and trace free air. Findings may represent an acute diverticulitis with perforation.  No drainable fluid collection. Follow-up needed with endoscopy.     CXR AP and Lat (09/2/23)  Impression:  No acute abnormality of the chest    Assessment & Plan:   Jaron Downing is a 58 y.o. male being treated for his first episode of acute diverticulitis with contained microperforation. He is focally tender, but not peritonitic. WBC count 17.23. Cr 1.25. Afebrile, HDS. Warranting continued hospitalization and medical management to resolve  microperforation non-operatively. If successful we will have the patient follow-up with colonoscopy in 6-8 weeks and also with our General Surgery clinic to discuss sigmoidectomy. If medical management is unsuccessful patient will likely require an urgent surgery and colostomy. Both plans discussed with patient in depth.     Plan:   - Continue NPO, ice chips ok  - IV cipro/flagyl  - IVF  - I's & O's  - MMPC  - PRN antiemetics  - OOB to chair, ad doug activity  - Restart Home Meds  -  Lovenox 40 daily    Thank you,     Valentino Holland, MS3  [facility] General Surgery    Latrell Xie MD  [facility] General Surgery HO-1  9:11 AM

## 2023-09-04 LAB
ANION GAP SERPL CALC-SCNC: 9 MEQ/L
BUN SERPL-MCNC: 10.3 MG/DL (ref 8.4–25.7)
CALCIUM SERPL-MCNC: 9.2 MG/DL (ref 8.4–10.2)
CHLORIDE SERPL-SCNC: 105 MMOL/L (ref 98–107)
CO2 SERPL-SCNC: 25 MMOL/L (ref 22–29)
CREAT SERPL-MCNC: 1.34 MG/DL (ref 0.73–1.18)
CREAT/UREA NIT SERPL: 8
ERYTHROCYTE [DISTWIDTH] IN BLOOD BY AUTOMATED COUNT: 14.1 % (ref 11.5–17)
GFR SERPLBLD CREATININE-BSD FMLA CKD-EPI: >60 MLS/MIN/1.73/M2
GLUCOSE SERPL-MCNC: 110 MG/DL (ref 74–100)
HCT VFR BLD AUTO: 37.2 % (ref 42–52)
HGB BLD-MCNC: 12.1 G/DL (ref 14–18)
MAGNESIUM SERPL-MCNC: 2.1 MG/DL (ref 1.6–2.6)
MCH RBC QN AUTO: 29.4 PG (ref 27–31)
MCHC RBC AUTO-ENTMCNC: 32.5 G/DL (ref 33–36)
MCV RBC AUTO: 90.3 FL (ref 80–94)
NRBC BLD AUTO-RTO: 0 %
PHOSPHATE SERPL-MCNC: 2.4 MG/DL (ref 2.3–4.7)
PLATELET # BLD AUTO: 208 X10(3)/MCL (ref 130–400)
PMV BLD AUTO: 9.9 FL (ref 7.4–10.4)
POTASSIUM SERPL-SCNC: 3.6 MMOL/L (ref 3.5–5.1)
RBC # BLD AUTO: 4.12 X10(6)/MCL (ref 4.7–6.1)
SODIUM SERPL-SCNC: 139 MMOL/L (ref 136–145)
WBC # SPEC AUTO: 12.11 X10(3)/MCL (ref 4.5–11.5)

## 2023-09-04 PROCEDURE — 21400001 HC TELEMETRY ROOM

## 2023-09-04 PROCEDURE — 84100 ASSAY OF PHOSPHORUS: CPT

## 2023-09-04 PROCEDURE — 25000003 PHARM REV CODE 250: Performed by: STUDENT IN AN ORGANIZED HEALTH CARE EDUCATION/TRAINING PROGRAM

## 2023-09-04 PROCEDURE — 63600175 PHARM REV CODE 636 W HCPCS: Performed by: STUDENT IN AN ORGANIZED HEALTH CARE EDUCATION/TRAINING PROGRAM

## 2023-09-04 PROCEDURE — 63600175 PHARM REV CODE 636 W HCPCS

## 2023-09-04 PROCEDURE — 94761 N-INVAS EAR/PLS OXIMETRY MLT: CPT

## 2023-09-04 PROCEDURE — 83735 ASSAY OF MAGNESIUM: CPT

## 2023-09-04 PROCEDURE — 80048 BASIC METABOLIC PNL TOTAL CA: CPT

## 2023-09-04 PROCEDURE — 85027 COMPLETE CBC AUTOMATED: CPT

## 2023-09-04 PROCEDURE — 25000003 PHARM REV CODE 250

## 2023-09-04 RX ORDER — CIPROFLOXACIN 500 MG/1
500 TABLET ORAL EVERY 12 HOURS
Status: DISCONTINUED | OUTPATIENT
Start: 2023-09-04 | End: 2023-09-05 | Stop reason: HOSPADM

## 2023-09-04 RX ORDER — METRONIDAZOLE 500 MG/1
500 TABLET ORAL EVERY 8 HOURS
Status: DISCONTINUED | OUTPATIENT
Start: 2023-09-04 | End: 2023-09-05 | Stop reason: HOSPADM

## 2023-09-04 RX ADMIN — CIPROFLOXACIN 500 MG: 500 TABLET, FILM COATED ORAL at 09:09

## 2023-09-04 RX ADMIN — CIPROFLOXACIN 500 MG: 500 TABLET, FILM COATED ORAL at 08:09

## 2023-09-04 RX ADMIN — SODIUM CHLORIDE, POTASSIUM CHLORIDE, SODIUM LACTATE AND CALCIUM CHLORIDE: 600; 310; 30; 20 INJECTION, SOLUTION INTRAVENOUS at 09:09

## 2023-09-04 RX ADMIN — HEPARIN SODIUM 5000 UNITS: 5000 INJECTION, SOLUTION INTRAVENOUS; SUBCUTANEOUS at 06:09

## 2023-09-04 RX ADMIN — OXYCODONE HYDROCHLORIDE 5 MG: 5 TABLET ORAL at 05:09

## 2023-09-04 RX ADMIN — ACETAMINOPHEN 650 MG: 325 TABLET, FILM COATED ORAL at 11:09

## 2023-09-04 RX ADMIN — ALLOPURINOL 300 MG: 100 TABLET ORAL at 08:09

## 2023-09-04 RX ADMIN — OXYCODONE HYDROCHLORIDE 5 MG: 5 TABLET ORAL at 11:09

## 2023-09-04 RX ADMIN — METRONIDAZOLE 500 MG: 500 TABLET ORAL at 09:09

## 2023-09-04 RX ADMIN — Medication 6 MG: at 09:09

## 2023-09-04 RX ADMIN — OXYCODONE HYDROCHLORIDE 5 MG: 5 TABLET ORAL at 06:09

## 2023-09-04 RX ADMIN — SODIUM CHLORIDE, POTASSIUM CHLORIDE, SODIUM LACTATE AND CALCIUM CHLORIDE: 600; 310; 30; 20 INJECTION, SOLUTION INTRAVENOUS at 01:09

## 2023-09-04 RX ADMIN — METRONIDAZOLE 500 MG: 500 TABLET ORAL at 02:09

## 2023-09-04 RX ADMIN — SODIUM CHLORIDE, POTASSIUM CHLORIDE, SODIUM LACTATE AND CALCIUM CHLORIDE: 600; 310; 30; 20 INJECTION, SOLUTION INTRAVENOUS at 02:09

## 2023-09-04 RX ADMIN — HEPARIN SODIUM 5000 UNITS: 5000 INJECTION, SOLUTION INTRAVENOUS; SUBCUTANEOUS at 02:09

## 2023-09-04 RX ADMIN — CIPROFLOXACIN 400 MG: 2 INJECTION, SOLUTION INTRAVENOUS at 06:09

## 2023-09-04 RX ADMIN — METOPROLOL SUCCINATE 25 MG: 25 TABLET, EXTENDED RELEASE ORAL at 08:09

## 2023-09-04 RX ADMIN — OXYCODONE HYDROCHLORIDE 5 MG: 5 TABLET ORAL at 09:09

## 2023-09-04 RX ADMIN — HEPARIN SODIUM 5000 UNITS: 5000 INJECTION, SOLUTION INTRAVENOUS; SUBCUTANEOUS at 09:09

## 2023-09-04 RX ADMIN — METRONIDAZOLE 500 MG: 5 INJECTION, SOLUTION INTRAVENOUS at 06:09

## 2023-09-04 NOTE — PROGRESS NOTES
"Acute Care and General Surgery   Progress Note  Admit Date: 9/2/2023  HD#2  POD#* No surgery found *    Subjective:   Interval history:  AFHDS, no acute events overnight.  Abdominal pain mild, same as yesterday per pt, worst in LLQ.   Day 3 of cirpo/flagyl  No bowel movements over night, passing gas  No n/v, chills, fever  OOB, ambulating       Home Meds:  Current Outpatient Medications   Medication Instructions    allopurinoL (ZYLOPRIM) 300 mg, Oral, Daily    FLUoxetine 20 mg, Oral, Daily    lisinopriL-hydrochlorothiazide (PRINZIDE,ZESTORETIC) 20-12.5 mg per tablet 1 tablet, Oral, Daily    meloxicam (MOBIC) 15 mg, Oral, Daily    metoprolol succinate (TOPROL-XL) 25 mg, Oral, Daily      Scheduled Meds:   acetaminophen  650 mg Oral Q6H    allopurinoL  300 mg Oral Daily    ciprofloxacin  400 mg Intravenous Q12H    FLUoxetine  20 mg Oral Daily    heparin (porcine)  5,000 Units Subcutaneous Q8H    metoprolol succinate  25 mg Oral Daily    metronidazole  500 mg Intravenous Q8H     Continuous Infusions:   lactated ringers Stopped (09/04/23 0634)     PRN Meds:acetaminophen, LIDOcaine (PF) 10 mg/ml (1%), melatonin, morphine, ondansetron, oxyCODONE, promethazine, sodium chloride 0.9%     Objective:     VITAL SIGNS: 24 HR MIN & MAX LAST   Temp  Min: 98 °F (36.7 °C)  Max: 99.3 °F (37.4 °C)  99.3 °F (37.4 °C)   BP  Min: 129/72  Max: 148/77  (!) 146/78    Pulse  Min: 75  Max: 85  75    Resp  Min: 18  Max: 20  20    SpO2  Min: 94 %  Max: 98 %  96 %      HT: 5' 6" (167.6 cm)  WT: 105.2 kg (232 lb)  BMI: 37.5     Intake/output:  Intake/Output - Last 3 Shifts         09/02 0700 09/03 0659 09/03 0700 09/04 0659 09/04 0700 09/05 0659    I.V. (mL/kg) 1310.3 (12.5) 1988.1 (18.9)     IV Piggyback 1749.8 937.2     Total Intake(mL/kg) 3060.1 (29.1) 2925.3 (27.8)     Net +3060.1 +2925.3            Urine Occurrence  1 x     Stool Occurrence 1 x 0 x             Intake/Output Summary (Last 24 hours) at 9/4/2023 0711  Last data filed at " 9/4/2023 0647  Gross per 24 hour   Intake 2925.28 ml   Output --   Net 2925.28 ml         Lines/drains/airway:       Peripheral IV - Single Lumen 09/02/23 0605 18 G Posterior;Right Hand (Active)   Site Assessment Clean;Dry;Intact 09/04/23 0400   Extremity Assessment Distal to IV No redness;No abnormal discoloration;No swelling;No warmth 09/03/23 2000   Line Status Infusing 09/03/23 2000   Dressing Status Clean;Dry;Intact 09/03/23 2000   Dressing Intervention Integrity maintained 09/03/23 2000   Number of days: 2       Physical examination:  Gen: NAD, AAOx3, answering questions appropriately  HEENT:  CV: RR  Resp: NWOB  Abd: Mild tenderness to LLQ, mildly distended  Ext: moving all extremities spontaneously and purposefully  Neuro: CN II-XII grossly intact    Labs:  WBC/Hgb/Hct/Plts:  12.11/12.1/37.2/208 (09/04 0344)  Na/K/Cl/CO2:  139/3.6/105/25 (09/04 0344)  BUN/Cr/glu/ALT/AST/amyl/lip:  10.3/1.34/--/--/--/--/-- (09/04 0344)     Imaging:  CT abdomen Pelvis with Contrast (09/2/23 @7:04 am)   Impression per radiology:   Focal wall thickening of the sigmoid colon with surrounding inflammatory changes and trace free air. Findings may represent an acute diverticulitis with perforation.  No drainable fluid collection. Follow-up needed with endoscopy.     CXR AP and Lat (09/2/23)  Impression:  No acute abnormality of the chest       Assessment & Plan:   Jaron Downing is a 58 y.o. male here being managed for his initial episode of acute diverticulitis with contained microperforation. He is focally tender to LLQ, not peritonitic. WBC down trending from 17.23 -> 12.11, Cr 1.34. Afebrile, HDS. Here for medical management to resolve microperforation non-operatively. If successful plan for outpatient colonoscopy in 6-8 weeks and follow-up with general surgery clinic to discuss sigmoidectomy. If medical management unsuccessful he will likely require an urgent surgery and colostomy.     - Escalate to CLD  - switch from IV ->oral  cipro/flagyl   - WBC downtrending 17.23 -> 12.11  - Cr 1.66 -> 1.25 -> 1.34. Increase IVF  - Is & Os  - MMPC  - PRN antiemetics  - OOB  - Continue home allopurinol, fluoxetine, metoprolol  - Continue lovenox 40 daily    Cora Chicas MD  [facility] Surgery HO-1  7:11 AM

## 2023-09-05 VITALS
TEMPERATURE: 98 F | OXYGEN SATURATION: 97 % | RESPIRATION RATE: 16 BRPM | BODY MASS INDEX: 37.28 KG/M2 | HEIGHT: 66 IN | SYSTOLIC BLOOD PRESSURE: 159 MMHG | DIASTOLIC BLOOD PRESSURE: 87 MMHG | WEIGHT: 232 LBS | HEART RATE: 76 BPM

## 2023-09-05 PROBLEM — K57.20 DIVERTICULITIS OF LARGE INTESTINE WITH PERFORATION WITHOUT ABSCESS: Status: ACTIVE | Noted: 2023-09-05

## 2023-09-05 LAB
ANION GAP SERPL CALC-SCNC: 8 MEQ/L
BUN SERPL-MCNC: 9.3 MG/DL (ref 8.4–25.7)
CALCIUM SERPL-MCNC: 9.4 MG/DL (ref 8.4–10.2)
CHLORIDE SERPL-SCNC: 107 MMOL/L (ref 98–107)
CO2 SERPL-SCNC: 26 MMOL/L (ref 22–29)
CREAT SERPL-MCNC: 1.29 MG/DL (ref 0.73–1.18)
CREAT/UREA NIT SERPL: 7
ERYTHROCYTE [DISTWIDTH] IN BLOOD BY AUTOMATED COUNT: 13.8 % (ref 11.5–17)
GFR SERPLBLD CREATININE-BSD FMLA CKD-EPI: >60 MLS/MIN/1.73/M2
GLUCOSE SERPL-MCNC: 112 MG/DL (ref 74–100)
HCT VFR BLD AUTO: 38 % (ref 42–52)
HGB BLD-MCNC: 12.5 G/DL (ref 14–18)
MAGNESIUM SERPL-MCNC: 1.9 MG/DL (ref 1.6–2.6)
MCH RBC QN AUTO: 29.2 PG (ref 27–31)
MCHC RBC AUTO-ENTMCNC: 32.9 G/DL (ref 33–36)
MCV RBC AUTO: 88.8 FL (ref 80–94)
NRBC BLD AUTO-RTO: 0 %
PHOSPHATE SERPL-MCNC: 3 MG/DL (ref 2.3–4.7)
PLATELET # BLD AUTO: 239 X10(3)/MCL (ref 130–400)
PMV BLD AUTO: 10 FL (ref 7.4–10.4)
POTASSIUM SERPL-SCNC: 3.8 MMOL/L (ref 3.5–5.1)
RBC # BLD AUTO: 4.28 X10(6)/MCL (ref 4.7–6.1)
SODIUM SERPL-SCNC: 141 MMOL/L (ref 136–145)
WBC # SPEC AUTO: 10.76 X10(3)/MCL (ref 4.5–11.5)

## 2023-09-05 PROCEDURE — 25000003 PHARM REV CODE 250: Performed by: STUDENT IN AN ORGANIZED HEALTH CARE EDUCATION/TRAINING PROGRAM

## 2023-09-05 PROCEDURE — 80048 BASIC METABOLIC PNL TOTAL CA: CPT

## 2023-09-05 PROCEDURE — 63600175 PHARM REV CODE 636 W HCPCS: Performed by: STUDENT IN AN ORGANIZED HEALTH CARE EDUCATION/TRAINING PROGRAM

## 2023-09-05 PROCEDURE — 84100 ASSAY OF PHOSPHORUS: CPT

## 2023-09-05 PROCEDURE — 85027 COMPLETE CBC AUTOMATED: CPT

## 2023-09-05 PROCEDURE — 83735 ASSAY OF MAGNESIUM: CPT

## 2023-09-05 PROCEDURE — 25000003 PHARM REV CODE 250

## 2023-09-05 PROCEDURE — 94761 N-INVAS EAR/PLS OXIMETRY MLT: CPT

## 2023-09-05 RX ORDER — POTASSIUM CHLORIDE 20 MEQ/1
40 TABLET, EXTENDED RELEASE ORAL ONCE
Status: COMPLETED | OUTPATIENT
Start: 2023-09-05 | End: 2023-09-05

## 2023-09-05 RX ORDER — POLYETHYLENE GLYCOL 3350 17 G/17G
17 POWDER, FOR SOLUTION ORAL DAILY
Qty: 14 EACH | Refills: 0 | Status: SHIPPED | OUTPATIENT
Start: 2023-09-05 | End: 2023-09-19

## 2023-09-05 RX ORDER — LANOLIN ALCOHOL/MO/W.PET/CERES
400 CREAM (GRAM) TOPICAL ONCE
Status: COMPLETED | OUTPATIENT
Start: 2023-09-05 | End: 2023-09-05

## 2023-09-05 RX ORDER — CIPROFLOXACIN 500 MG/1
500 TABLET ORAL EVERY 12 HOURS
Qty: 21 TABLET | Refills: 0 | Status: SHIPPED | OUTPATIENT
Start: 2023-09-05 | End: 2023-09-15

## 2023-09-05 RX ORDER — HYDROCODONE BITARTRATE AND ACETAMINOPHEN 5; 325 MG/1; MG/1
1 TABLET ORAL EVERY 6 HOURS PRN
Qty: 12 TABLET | Refills: 0 | Status: ON HOLD | OUTPATIENT
Start: 2023-09-05 | End: 2023-10-13 | Stop reason: ALTCHOICE

## 2023-09-05 RX ORDER — METRONIDAZOLE 500 MG/1
500 TABLET ORAL EVERY 8 HOURS
Qty: 31 TABLET | Refills: 0 | Status: SHIPPED | OUTPATIENT
Start: 2023-09-05 | End: 2023-09-15

## 2023-09-05 RX ADMIN — HEPARIN SODIUM 5000 UNITS: 5000 INJECTION, SOLUTION INTRAVENOUS; SUBCUTANEOUS at 01:09

## 2023-09-05 RX ADMIN — ACETAMINOPHEN 650 MG: 325 TABLET, FILM COATED ORAL at 11:09

## 2023-09-05 RX ADMIN — ACETAMINOPHEN 650 MG: 325 TABLET, FILM COATED ORAL at 06:09

## 2023-09-05 RX ADMIN — POTASSIUM CHLORIDE 40 MEQ: 1500 TABLET, EXTENDED RELEASE ORAL at 06:09

## 2023-09-05 RX ADMIN — METRONIDAZOLE 500 MG: 500 TABLET ORAL at 06:09

## 2023-09-05 RX ADMIN — OXYCODONE HYDROCHLORIDE 5 MG: 5 TABLET ORAL at 05:09

## 2023-09-05 RX ADMIN — METOPROLOL SUCCINATE 25 MG: 25 TABLET, EXTENDED RELEASE ORAL at 08:09

## 2023-09-05 RX ADMIN — ALLOPURINOL 300 MG: 100 TABLET ORAL at 08:09

## 2023-09-05 RX ADMIN — Medication 400 MG: at 06:09

## 2023-09-05 RX ADMIN — METRONIDAZOLE 500 MG: 500 TABLET ORAL at 01:09

## 2023-09-05 RX ADMIN — HEPARIN SODIUM 5000 UNITS: 5000 INJECTION, SOLUTION INTRAVENOUS; SUBCUTANEOUS at 06:09

## 2023-09-05 RX ADMIN — CIPROFLOXACIN 500 MG: 500 TABLET, FILM COATED ORAL at 08:09

## 2023-09-05 NOTE — MEDICAL/APP STUDENT
"Acute Care and General Surgery   Progress Note  Admit Date: 9/2/2023  HD#3  POD#* No surgery found *    Subjective:   Interval history:  ***  Home Meds:  Current Outpatient Medications   Medication Instructions    allopurinoL (ZYLOPRIM) 300 mg, Oral, Daily    FLUoxetine 20 mg, Oral, Daily    lisinopriL-hydrochlorothiazide (PRINZIDE,ZESTORETIC) 20-12.5 mg per tablet 1 tablet, Oral, Daily    meloxicam (MOBIC) 15 mg, Oral, Daily    metoprolol succinate (TOPROL-XL) 25 mg, Oral, Daily      Scheduled Meds:   acetaminophen  650 mg Oral Q6H    allopurinoL  300 mg Oral Daily    ciprofloxacin HCl  500 mg Oral Q12H    FLUoxetine  20 mg Oral Daily    heparin (porcine)  5,000 Units Subcutaneous Q8H    metoprolol succinate  25 mg Oral Daily    metroNIDAZOLE  500 mg Oral Q8H     Continuous Infusions:   lactated ringers 125 mL/hr at 09/04/23 2147     PRN Meds:acetaminophen, LIDOcaine (PF) 10 mg/ml (1%), melatonin, morphine, ondansetron, oxyCODONE, promethazine, sodium chloride 0.9%     Objective:     VITAL SIGNS: 24 HR MIN & MAX LAST   Temp  Min: 98.5 °F (36.9 °C)  Max: 100 °F (37.8 °C)  98.7 °F (37.1 °C)   BP  Min: 143/76  Max: 148/80  (!) 145/83    Pulse  Min: 73  Max: 81  73    Resp  Min: 14  Max: 20  20    SpO2  Min: 95 %  Max: 98 %  98 %      HT: 5' 6" (167.6 cm)  WT: 105.2 kg (232 lb)  BMI: 37.5     Intake/output:  Intake/Output - Last 3 Shifts         09/03 0700  09/04 0659 09/04 0700  09/05 0659    P.O.  480    I.V. (mL/kg) 1988.1 (18.9) 1258 (12)    IV Piggyback 937.2 228.7    Total Intake(mL/kg) 2925.3 (27.8) 1966.7 (18.7)    Net +2925.3 +1966.7          Urine Occurrence 1 x 5 x    Stool Occurrence 0 x 8 x            Intake/Output Summary (Last 24 hours) at 9/5/2023 0645  Last data filed at 9/5/2023 0349  Gross per 24 hour   Intake 3236.38 ml   Output --   Net 3236.38 ml         Lines/drains/airway:       Peripheral IV - Single Lumen 09/02/23 0605 18 G Posterior;Right Hand (Active)   Site Assessment Clean;Dry;Intact " 09/05/23 0400   Extremity Assessment Distal to IV No redness;No abnormal discoloration;No swelling;No warmth 09/03/23 2000   Line Status Flushed;Infusing 09/04/23 2000   Dressing Status Clean;Dry;Intact 09/04/23 2000   Dressing Intervention Integrity maintained 09/04/23 2000   Number of days: 3       Physical examination:  Gen: NAD, AAOx3, answering questions appropriately  HEENT:  CV: RR  Resp: NWOB  Abd: S/NT/ND***  Ext: moving all extremities spontaneously and purposefully  Neuro: CN II-XII grossly intact  Skin/wounds: ***    Labs:  WBC/Hgb/Hct/Plts:  10.76/12.5/38.0/239 (09/05 0333)  Na/K/Cl/CO2:  141/3.8/107/26 (09/05 0333)  BUN/Cr/glu/ALT/AST/amyl/lip:  9.3/1.29/--/--/--/--/-- (09/05 0333)     Imaging:  ***    Assessment & Plan:   Jaron Downing is a 58 y.o. male with ***    Latrell Xie MD  [facility] General Surgery HO-1  6:45 AM

## 2023-09-05 NOTE — PLAN OF CARE
09/05/23 1203   Discharge Assessment   Assessment Type Discharge Planning Assessment   Confirmed/corrected address, phone number and insurance Yes   Confirmed Demographics Correct on Facesheet   Source of Information patient;family   Reason For Admission Admission Dx    K57.92 Diverticulitis  N28.9 Acute renal insufficiency  R73.9 Hyperglycemia  R10.9 Abdominal pain  D72.829 Leukocytosis, unspecified type  K57.80 Perforated diverticulum of intestine   People in Home significant other   Facility Arrived From: Home   Do you expect to return to your current living situation? Yes   Do you have help at home or someone to help you manage your care at home? Yes   Who are your caregiver(s) and their phone number(s)? Abigail Cartagena (Friend)   [phone]   Prior to hospitilization cognitive status: Alert/Oriented   Current cognitive status: Alert/Oriented   Equipment Currently Used at Home none   Readmission within 30 days? No   Patient currently being followed by outpatient case management? No   Do you currently have service(s) that help you manage your care at home? No   Do you take prescription medications? Yes  ([facility] [place])   Do you have prescription coverage? No   Do you have any problems affording any of your prescribed medications? TBD   Is the patient taking medications as prescribed? yes   Who is going to help you get home at discharge? SO   How do you get to doctors appointments? car, drives self   Are you on dialysis? No   DME Needed Upon Discharge  none   Discharge Plan discussed with: Patient;Spouse/sig other   Name(s) and Number(s) OsielAbigail (Friend)   [phone]   Transition of Care Barriers None   Discharge Plan A Home with family   Physical Activity   On average, how many days per week do you engage in moderate to strenuous exercise (like a brisk walk)? 0 days   On average, how many minutes do you engage in exercise at this level? 0 min   Financial Resource Strain   How hard is it for you  to pay for the very basics like food, housing, medical care, and heating? Not hard   Housing Stability   In the last 12 months, was there a time when you were not able to pay the mortgage or rent on time? N   In the last 12 months, how many places have you lived? 1   Transportation Needs   In the past 12 months, has lack of transportation kept you from medical appointments or from getting medications? no   In the past 12 months, has lack of transportation kept you from meetings, work, or from getting things needed for daily living? No   Food Insecurity   Within the past 12 months, you worried that your food would run out before you got the money to buy more. Never true   Within the past 12 months, the food you bought just didn't last and you didn't have money to get more. Never true   Stress   Do you feel stress - tense, restless, nervous, or anxious, or unable to sleep at night because your mind is troubled all the time - these days? Not at all   Social Connections   In a typical week, how many times do you talk on the phone with family, friends, or neighbors? More than 3   How often do you get together with friends or relatives? More than 3   How often do you attend Sikh or Catholic services? Never   Do you belong to any clubs or organizations such as Sikh groups, unions, fraternal or athletic groups, or school groups? No   How often do you attend meetings of the clubs or organizations you belong to? Never   Are you , , , , never , or living with a partner? Living with   Alcohol Use   Q1: How often do you have a drink containing alcohol? Never   Q2: How many drinks containing alcohol do you have on a typical day when you are drinking? None   Q3: How often do you have six or more drinks on one occasion? Never   OTHER   Name(s) of People in Home Abigail Cartagena (Friend)   [phone]     Pt single with 3 living children; Resides with Abigail MITCHELL; No POA; Pt retired ;  Independent with ADL's; Uninsured-Pending M/D application.

## 2023-09-05 NOTE — PROGRESS NOTES
"Acute Care and General Surgery   Progress Note  Admit Date: 9/2/2023  HD#3  POD#* No surgery found *    Subjective:   Interval history:  AFHDS, no acute events overnight  Started on CLD yesterday   No abdominal pain at baseline, minimally tender on exam   Day 4 of cipro/flagyl, converted to oral today   BM x8 formed stool, without blood or pain, no tenesmus   Void x5 episodes without burning   No n/v, chills, fever  OOB, ambulating     Home Meds:  Current Outpatient Medications   Medication Instructions    allopurinoL (ZYLOPRIM) 300 mg, Oral, Daily    FLUoxetine 20 mg, Oral, Daily    lisinopriL-hydrochlorothiazide (PRINZIDE,ZESTORETIC) 20-12.5 mg per tablet 1 tablet, Oral, Daily    meloxicam (MOBIC) 15 mg, Oral, Daily    metoprolol succinate (TOPROL-XL) 25 mg, Oral, Daily      Scheduled Meds:   acetaminophen  650 mg Oral Q6H    allopurinoL  300 mg Oral Daily    ciprofloxacin HCl  500 mg Oral Q12H    FLUoxetine  20 mg Oral Daily    heparin (porcine)  5,000 Units Subcutaneous Q8H    metoprolol succinate  25 mg Oral Daily    metroNIDAZOLE  500 mg Oral Q8H     Continuous Infusions:   lactated ringers 125 mL/hr at 09/04/23 2147     PRN Meds:acetaminophen, LIDOcaine (PF) 10 mg/ml (1%), melatonin, morphine, ondansetron, oxyCODONE, promethazine, sodium chloride 0.9%     Objective:     VITAL SIGNS: 24 HR MIN & MAX LAST   Temp  Min: 98.5 °F (36.9 °C)  Max: 100 °F (37.8 °C)  98.7 °F (37.1 °C)   BP  Min: 143/76  Max: 148/80  (!) 145/83    Pulse  Min: 73  Max: 81  73    Resp  Min: 14  Max: 20  20    SpO2  Min: 95 %  Max: 98 %  98 %      HT: 5' 6" (167.6 cm)  WT: 105.2 kg (232 lb)  BMI: 37.5     Intake/output:  Intake/Output - Last 3 Shifts         09/03 0700  09/04 0659 09/04 0700  09/05 0659    P.O.  480    I.V. (mL/kg) 1988.1 (18.9) 1258 (12)    IV Piggyback 937.2 228.7    Total Intake(mL/kg) 2925.3 (27.8) 1966.7 (18.7)    Net +2925.3 +1966.7          Urine Occurrence 1 x 5 x    Stool Occurrence 0 x 8 x      "       Intake/Output Summary (Last 24 hours) at 9/5/2023 0643  Last data filed at 9/5/2023 0349  Gross per 24 hour   Intake 3236.38 ml   Output --   Net 3236.38 ml           Lines/drains/airway:       Peripheral IV - Single Lumen 09/02/23 0605 18 G Posterior;Right Hand (Active)   Site Assessment Clean;Dry;Intact 09/04/23 0400   Extremity Assessment Distal to IV No redness;No abnormal discoloration;No swelling;No warmth 09/03/23 2000   Line Status Infusing 09/03/23 2000   Dressing Status Clean;Dry;Intact 09/03/23 2000   Dressing Intervention Integrity maintained 09/03/23 2000   Number of days: 2       Physical examination:  Gen: NAD, AAOx3, answering questions appropriately  HEENT:  CV: RR  Resp: NWOB  Abd: minimal tenderness to LLQ, mildly distended  Ext: moving all extremities spontaneously and purposefully  Neuro: CN II-XII grossly intact    Labs:  WBC/Hgb/Hct/Plts:  10.76/12.5/38.0/239 (09/05 0333)  Na/K/Cl/CO2:  141/3.8/107/26 (09/05 0333)  BUN/Cr/glu/ALT/AST/amyl/lip:  9.3/1.29/--/--/--/--/-- (09/05 0333)     Imaging:  CT abdomen Pelvis with Contrast (09/2/23 @7:04 am)   Impression per radiology:   Focal wall thickening of the sigmoid colon with surrounding inflammatory changes and trace free air. Findings may represent an acute diverticulitis with perforation.  No drainable fluid collection. Follow-up needed with endoscopy.     CXR AP and Lat (09/2/23)  Impression:  No acute abnormality of the chest      Assessment & Plan:   Jaron Downing is a 58 y.o. male here being managed for his initial episode of acute diverticulitis with contained microperforation. His tenderness to LLQ is improved. WBC down trending from 18.82 > 17.23 > 12.11 > 10.76, Cr 1.65 > 1.25 > 1.34 > 1.29. Afebrile, HDS. Doing well with nonoperative management. Will likely discharge today with abx and follow up.     - PO cipro/flagyl   - regular diet  - d/c fluids   - MMPC  - PRN antiemetics  - will check in PM, if continues to do well with regular  diet will discharge   - plan for ambulatory referral to GI and colonoscopy in 6-8 weeks  - follow-up with General Surgery clinic afterward to discuss sigmoidectomy    Latrell Xie MD  [employer] General Surgery -1  7:15 AM

## 2023-09-05 NOTE — DISCHARGE SUMMARY
[facility] - [address] [place] [address]  General Surgery  Discharge Summary      Patient Name: Jaron Downing  MRN: [id number]  Admission Date: 9/2/2023  Hospital Length of Stay: 3 days  Discharge Date and Time: 9/5/2023  6:00 PM  Attending Physician: Christal att. providers found   Discharging Provider: Latrell Xie MD  Primary Care Provider: Christal, Primary Doctor     HPI:   Jaron Downing is a 58 y.o. male with PMHx of HTN, Cigar usage, who presented to the ED on 09/02/23 with acute abdominal pain, beginning 8 hours prior to arrival, which started in the RLQ before shifting to it's current location in the L lower quadrant region. He has never had pain like this before. Pt. states he vomited once at midnight. He has no nausea since then, no fever, or chills. He is passing gas today and can not recall his last bowel movement. He denies chest pain, dyspnea, pain on urination, bloody stools, diarrhea. He has never undergone colonoscopy.     * No surgery found *     Hospital Course:   Patient was admitted for nonoperative management 1st occurrence of diverticulitis.  Started on IV Cipro/Flagyl and IV fluids given LESA which was also found on admission.  Made NPO.  On several days for abdominal exams, patient continued to have improvement of his symptoms.  Started on clear liquid diet and IV antibiotics converted to oral.  Today, patient was advanced to a regular diet which he tolerated well.  Near complete resolution of his symptoms today.  We will discharge to follow up with GI for colonoscopy in 6-8 weeks.  He will follow up with surgery to discuss sigmoidectomy after that.  On discharge, he will complete his oral antibiotic course.  Return precautions given prior to discharge.  Discussed with him following up with his PCP for elevation of creatinine.    Consults: none    Significant Diagnostic Studies: Labs: BMP:   Recent Labs   Lab 09/04/23  0344 09/05/23  0333    141   K 3.6 3.8   CO2 25 26   BUN 10.3 9.3   CREATININE 1.34*  1.29*   CALCIUM 9.2 9.4   MG 2.10 1.90    and CBC   Recent Labs   Lab 09/04/23  0344 09/05/23  0333   WBC 12.11* 10.76   HGB 12.1* 12.5*   HCT 37.2* 38.0*    239     Radiology: CT scan: CT ABDOMEN PELVIS WITH CONTRAST:   Results for orders placed or performed during the hospital encounter of 09/02/23   CT Abdomen Pelvis With Contrast    Narrative    EXAMINATION:  CT ABDOMEN PELVIS WITH CONTRAST    CLINICAL HISTORY:  Abdominal abscess/infection suspected;    TECHNIQUE:  CT imaging was performed of the abdomen and pelvis after the administration of intravenous contrast. Dose length product is 552 mGycm. Automatic exposure control, adjustment of mA/kV or iterative reconstruction technique was used to limit radiation dose.    COMPARISON:  None    FINDINGS:  Liver: Diffuse hepatic steatosis.    Gallbladder and biliary tree: No calcified gallstones. No intra or extrahepatic biliary ductal dilation.    Pancreas: Unremarkable.    Spleen: No acute findings.    Adrenals: Normal.    Kidneys and ureters: No hydronephrosis.    Bladder: Normal.    Reproductive organs: No pelvic masses.    Stomach/bowel: There is mild sigmoid diverticulosis.  There is focal wall thickening of the sigmoid colon with surrounding inflammatory changes and trace free intraperitoneal air.    Lymph nodes: No pathologically enlarged lymph node identified.    Peritoneum: No drainable fluid collection.    Vessels: Mild scattered vascular calcifications.    Abdominal wall: Normal.    Lung bases: No consolidation or pleural effusion.    Bones: No acute osseous findings.      Impression    Focal wall thickening of the sigmoid colon with surrounding inflammatory changes and trace free air.  Findings may represent an acute diverticulitis with perforation.  No drainable fluid collection.  Follow-up needed with endoscopy.    No significant change from the [facility] interpretation.      Electronically signed by: Jodie  Alejandra  Date:    09/02/2023  Time:    09:20       Pending Diagnostic Studies:       Procedure Component Value Units Date/Time    EXTRA TUBES [[id number]] Collected: 09/02/23 0650    Order Status: Sent Lab Status: In process Updated: 09/02/23 0650    Specimen: Blood, Venous     Narrative:      The following orders were created for panel order EXTRA TUBES.  Procedure                               Abnormality         Status                     ---------                               -----------         ------                     Red Top Hold[[id number]]                                     In process                   Please view results for these tests on the individual orders.    EXTRA TUBES [[id number]] Collected: 09/02/23 0605    Order Status: Sent Lab Status: In process Updated: 09/02/23 0605    Specimen: Blood, Venous     Narrative:      The following orders were created for panel order EXTRA TUBES.  Procedure                               Abnormality         Status                     ---------                               -----------         ------                     Light Green Top Hold[[id number]]                             In process                 Lavender Top Hold[[id number]]                                In process                 Gold Top Hold[[id number]]                                    In process                   Please view results for these tests on the individual orders.          Final Active Diagnoses:    Diagnosis Date Noted POA    PRINCIPAL PROBLEM:  Diverticulitis of large intestine with perforation without abscess [K57.20] 09/05/2023 Yes      Problems Resolved During this Admission:      Discharged Condition: good    Disposition: Home or Self Care    Follow Up:    Patient Instructions:      Ambulatory referral/consult to Gastroenterology   Standing Status: Future   Referral Priority: Routine Referral Type: Consultation   Referral Reason: Specialty Services Required   Requested Specialty:  Gastroenterology   Number of Visits Requested: 1     No driving until:   Order Comments: No driving until off opioid pain medicines     Notify your health care provider if you experience any of the following:  temperature >100.4     Notify your health care provider if you experience any of the following:  persistent nausea and vomiting or diarrhea     Activity as tolerated     Medications:  Reconciled Home Medications:      Medication List        START taking these medications      ciprofloxacin HCl 500 MG tablet  Commonly known as: CIPRO  Take 1 tablet (500 mg total) by mouth every 12 (twelve) hours. for 10 days     HYDROcodone-acetaminophen 5-325 mg per tablet  Commonly known as: NORCO  Take 1 tablet by mouth every 6 (six) hours as needed for Pain.     metroNIDAZOLE 500 MG tablet  Commonly known as: FLAGYL  Take 1 tablet (500 mg total) by mouth every 8 (eight) hours. for 10 days     polyethylene glycol 17 gram Pwpk  Commonly known as: GLYCOLAX  Take 17 g by mouth once daily. for 14 days            CONTINUE taking these medications      allopurinoL 300 MG tablet  Commonly known as: ZYLOPRIM  Take 300 mg by mouth once daily.     FLUoxetine 20 MG tablet  Take 20 mg by mouth once daily.     lisinopriL-hydrochlorothiazide 20-12.5 mg per tablet  Commonly known as: PRINZIDE,ZESTORETIC  Take 1 tablet by mouth once daily.     metoprolol succinate 25 MG 24 hr tablet  Commonly known as: TOPROL-XL  Take 25 mg by mouth once daily.            STOP taking these medications      meloxicam 15 MG tablet  Commonly known as: CLARITZA Xie MD  General Surgery  [facility] - [address] [place] [address]

## 2023-09-07 LAB
BACTERIA BLD CULT: NORMAL
BACTERIA BLD CULT: NORMAL

## 2023-09-08 ENCOUNTER — TELEPHONE (OUTPATIENT)
Dept: GASTROENTEROLOGY | Facility: CLINIC | Age: 58
End: 2023-09-08

## 2023-09-08 DIAGNOSIS — Z12.11 SCREEN FOR COLON CANCER: Primary | ICD-10-CM

## 2023-09-08 RX ORDER — POLYETHYLENE GLYCOL 3350, SODIUM SULFATE, SODIUM CHLORIDE, POTASSIUM CHLORIDE, SODIUM ASCORBATE, AND ASCORBIC ACID 7.5-2.691G
KIT ORAL
Qty: 1 KIT | Refills: 0 | Status: SHIPPED | OUTPATIENT
Start: 2023-09-08 | End: 2023-11-15 | Stop reason: SDUPTHER

## 2023-10-12 ENCOUNTER — ANESTHESIA EVENT (OUTPATIENT)
Dept: ENDOSCOPY | Facility: HOSPITAL | Age: 58
End: 2023-10-12

## 2023-10-12 NOTE — ANESTHESIA PREPROCEDURE EVALUATION
10/12/2023  Jaron Downing is a 58 y.o., male. For CLN   History of diverticulitis      PMH of HTN LD BB @ 0400    Vitals:    10/13/23 0733   BP: 138/84   Pulse: 85   Resp: 18   Temp: 36.5 °C (97.7 °F)           Active Ambulatory Problems     Diagnosis Date Noted    Diverticulitis of large intestine with perforation without abscess 09/05/2023     Resolved Ambulatory Problems     Diagnosis Date Noted    No Resolved Ambulatory Problems     Past Medical History:   Diagnosis Date    Hypertension        History reviewed. No pertinent surgical history.    No current facility-administered medications on file prior to encounter.     Current Outpatient Medications on File Prior to Encounter   Medication Sig Dispense Refill    allopurinoL (ZYLOPRIM) 300 MG tablet Take 300 mg by mouth once daily.      FLUoxetine 20 MG tablet Take 20 mg by mouth once daily.      lisinopriL-hydrochlorothiazide (PRINZIDE,ZESTORETIC) 20-12.5 mg per tablet Take 1 tablet by mouth once daily.      metoprolol succinate (TOPROL-XL) 25 MG 24 hr tablet Take 25 mg by mouth once daily.      HYDROcodone-acetaminophen (NORCO) 5-325 mg per tablet Take 1 tablet by mouth every 6 (six) hours as needed for Pain. (Patient not taking: Reported on 9/29/2023) 12 tablet 0       Lab Results   Component Value Date    WBC 10.76 09/05/2023    HGB 12.5 (L) 09/05/2023    HCT 38.0 (L) 09/05/2023     09/05/2023    ALT 22 09/02/2023    AST 23 09/02/2023     09/05/2023    K 3.8 09/05/2023    CREATININE 1.29 (H) 09/05/2023    BUN 9.3 09/05/2023    CO2 26 09/05/2023    INR 1.0 09/02/2023    HGBA1C 5.9 09/02/2023           Pre-op Assessment    I have reviewed the Patient Summary Reports.     I have reviewed the Nursing Notes. I have reviewed the NPO Status.   I have reviewed the Medications.     Review of Systems  Anesthesia Hx:  No problems with previous  Anesthesia  History of prior surgery of interest to airway management or planning: Denies Family Hx of Anesthesia complications.   Denies Personal Hx of Anesthesia complications.   Hematology/Oncology:  Hematology Normal   Oncology Normal     EENT/Dental:EENT/Dental Normal   Cardiovascular:  Cardiovascular Normal     Pulmonary:  Pulmonary Normal    Renal/:  Renal/ Normal     Hepatic/GI:  Hepatic/GI Normal    Musculoskeletal:  Musculoskeletal Normal    Neurological:  Neurology Normal    Endocrine:  Endocrine Normal    Dermatological:  Skin Normal    Psych:  Psychiatric Normal           Physical Exam  General: Well nourished, Cooperative, Alert and Oriented    Airway:  Mallampati: I / I  Mouth Opening: Normal  TM Distance: Normal  Tongue: Normal  Neck ROM: Normal ROM    Dental:  Intact        Anesthesia Plan  Type of Anesthesia, risks & benefits discussed:    Anesthesia Type: Gen Natural Airway  Intra-op Monitoring Plan: Standard ASA Monitors  Post Op Pain Control Plan: IV/PO Opioids PRN  (medical reason for not using multimodal pain management)  Induction:  IV  Informed Consent: Informed consent signed with the Patient and all parties understand the risks and agree with anesthesia plan.  All questions answered. Patient consented to blood products? No  ASA Score: 3  Day of Surgery Review of History & Physical: H&P Update referred to the surgeon/provider.    Ready For Surgery From Anesthesia Perspective.     .

## 2023-10-13 ENCOUNTER — HOSPITAL ENCOUNTER (OUTPATIENT)
Facility: HOSPITAL | Age: 58
Discharge: HOME | End: 2023-10-13
Attending: COLON & RECTAL SURGERY | Admitting: COLON & RECTAL SURGERY

## 2023-10-13 ENCOUNTER — ANESTHESIA (OUTPATIENT)
Dept: ENDOSCOPY | Facility: HOSPITAL | Age: 58
End: 2023-10-13

## 2023-10-13 VITALS
RESPIRATION RATE: 18 BRPM | DIASTOLIC BLOOD PRESSURE: 89 MMHG | OXYGEN SATURATION: 98 % | HEART RATE: 86 BPM | SYSTOLIC BLOOD PRESSURE: 136 MMHG | TEMPERATURE: 98 F

## 2023-10-13 DIAGNOSIS — Z12.11 SCREENING FOR COLON CANCER: ICD-10-CM

## 2023-10-13 PROCEDURE — 27201423 OPTIME MED/SURG SUP & DEVICES STERILE SUPPLY: Performed by: COLON & RECTAL SURGERY

## 2023-10-13 PROCEDURE — D9220A PRA ANESTHESIA: ICD-10-PCS | Mod: 33,,, | Performed by: NURSE ANESTHETIST, CERTIFIED REGISTERED

## 2023-10-13 PROCEDURE — 37000009 HC ANESTHESIA EA ADD 15 MINS: Performed by: COLON & RECTAL SURGERY

## 2023-10-13 PROCEDURE — 88305 TISSUE EXAM BY PATHOLOGIST: CPT | Mod: TC | Performed by: COLON & RECTAL SURGERY

## 2023-10-13 PROCEDURE — 63600175 PHARM REV CODE 636 W HCPCS: Performed by: NURSE ANESTHETIST, CERTIFIED REGISTERED

## 2023-10-13 PROCEDURE — 63600175 PHARM REV CODE 636 W HCPCS: Performed by: SPECIALIST

## 2023-10-13 PROCEDURE — 88342 IMHCHEM/IMCYTCHM 1ST ANTB: CPT | Performed by: PATHOLOGY

## 2023-10-13 PROCEDURE — 37000008 HC ANESTHESIA 1ST 15 MINUTES: Performed by: COLON & RECTAL SURGERY

## 2023-10-13 PROCEDURE — D9220A PRA ANESTHESIA: Mod: 33,,, | Performed by: NURSE ANESTHETIST, CERTIFIED REGISTERED

## 2023-10-13 PROCEDURE — 88341 IMHCHEM/IMCYTCHM EA ADD ANTB: CPT

## 2023-10-13 PROCEDURE — 25000003 PHARM REV CODE 250: Performed by: NURSE ANESTHETIST, CERTIFIED REGISTERED

## 2023-10-13 PROCEDURE — 45385 COLONOSCOPY W/LESION REMOVAL: CPT | Mod: PT | Performed by: COLON & RECTAL SURGERY

## 2023-10-13 RX ORDER — PROPOFOL 10 MG/ML
INJECTION, EMULSION INTRAVENOUS
Status: DISCONTINUED | OUTPATIENT
Start: 2023-10-13 | End: 2023-10-13

## 2023-10-13 RX ORDER — SODIUM CHLORIDE, SODIUM LACTATE, POTASSIUM CHLORIDE, CALCIUM CHLORIDE 600; 310; 30; 20 MG/100ML; MG/100ML; MG/100ML; MG/100ML
INJECTION, SOLUTION INTRAVENOUS CONTINUOUS
Status: DISCONTINUED | OUTPATIENT
Start: 2023-10-13 | End: 2023-10-13 | Stop reason: HOSPADM

## 2023-10-13 RX ORDER — LIDOCAINE HYDROCHLORIDE 20 MG/ML
INJECTION INTRAVENOUS
Status: DISCONTINUED | OUTPATIENT
Start: 2023-10-13 | End: 2023-10-13

## 2023-10-13 RX ADMIN — PROPOFOL 50 MG: 10 INJECTION, EMULSION INTRAVENOUS at 08:10

## 2023-10-13 RX ADMIN — LIDOCAINE HYDROCHLORIDE 50 MG: 20 INJECTION INTRAVENOUS at 08:10

## 2023-10-13 RX ADMIN — PROPOFOL 25 MG: 10 INJECTION, EMULSION INTRAVENOUS at 08:10

## 2023-10-13 RX ADMIN — PROPOFOL 100 MG: 10 INJECTION, EMULSION INTRAVENOUS at 08:10

## 2023-10-13 RX ADMIN — SODIUM CHLORIDE, POTASSIUM CHLORIDE, SODIUM LACTATE AND CALCIUM CHLORIDE: 600; 310; 30; 20 INJECTION, SOLUTION INTRAVENOUS at 07:10

## 2023-10-13 NOTE — PROVATION PATIENT INSTRUCTIONS
Discharge Summary/Instructions after an Endoscopic Procedure  Patient Name: Jaron Downing  Patient MRN: [id number]  Patient YOB: 1965 Friday, October 13, 2023  Duran Bailey MD  Dear patient,  As a result of recent federal legislation (The Federal Cures Act), you may   receive lab or pathology results from your procedure in your [facility]   account before your physician is able to contact you. Your physician or   their representative will relay the results to you with their   recommendations at their soonest availability.  Thank you,  RESTRICTIONS:  During your procedure today, you received medications for sedation.  These   medications may affect your judgment, balance and coordination.  Therefore,   for 24 hours, you have the following restrictions:   - DO NOT drive a car, operate machinery, make legal/financial decisions,   sign important papers or drink alcohol.    ACTIVITY:  Today: no heavy lifting, straining or running due to procedural   sedation/anesthesia.  The following day: return to full activity including work.  DIET:  Eat and drink normally unless instructed otherwise.     TREATMENT FOR COMMON SIDE EFFECTS:  - Mild abdominal pain, nausea, belching, bloating or excessive gas:  rest,   eat lightly and use a heating pad.  - Sore Throat: treat with throat lozenges and/or gargle with warm salt   water.  - Because air was used during the procedure, expelling large amounts of air   from your rectum or belching is normal.  - If a bowel prep was taken, you may not have a bowel movement for 1-3 days.    This is normal.  SYMPTOMS TO WATCH FOR AND REPORT TO YOUR PHYSICIAN:  1. Abdominal pain or bloating, other than gas cramps.  2. Chest pain.  3. Back pain.  4. Signs of infection such as: chills or fever occurring within 24 hours   after the procedure.  5. Rectal bleeding, which would show as bright red, maroon, or black stools.   (A tablespoon of blood from the rectum is not serious, especially  if   hemorrhoids are present.)  6. Vomiting.  7. Weakness or dizziness.  GO DIRECTLY TO THE NEAREST EMERGENCY ROOM IF YOU HAVE ANY OF THE FOLLOWING:      Difficulty breathing              Chills and/or fever over 101 F   Persistent vomiting and/or vomiting blood   Severe abdominal pain   Severe chest pain   Black, tarry stools   Bleeding- more than one tablespoon   Any other symptom or condition that you feel may need urgent attention  Your doctor recommends these additional instructions:  If any biopsies were taken, your doctors clinic will contact you in 1 to 2   weeks with any results.  - Discharge patient to home.   - Resume previous diet.   - Continue present medications.   - Repeat colonoscopy in 3 years for surveillance.  For questions, problems or results please call your physician - Duran Bailey MD at Work:  [phone].  [facility] , EMERGENCY ROOM PHONE NUMBER: [phone]  IF A COMPLICATION OR EMERGENCY SITUATION ARISES AND YOU ARE UNABLE TO REACH   YOUR PHYSICIAN - GO DIRECTLY TO THE EMERGENCY ROOM.  MD Duran Archibald MD  10/13/2023 9:07:28 AM  This report has been verified and signed electronically.  Dear patient,  As a result of recent federal legislation (The Federal Cures Act), you may   receive lab or pathology results from your procedure in your [facility]   account before your physician is able to contact you. Your physician or   their representative will relay the results to you with their   recommendations at their soonest availability.  Thank you,  PROVATION

## 2023-10-13 NOTE — H&P
Jaron Downing is a 58 y.o., male. For CLN   History of diverticulitis        PMH of HTN LD                Active Ambulatory Problems     Diagnosis Date Noted    Diverticulitis of large intestine with perforation without abscess 09/05/2023           Resolved Ambulatory Problems     Diagnosis Date Noted    No Resolved Ambulatory Problems           Past Medical History:   Diagnosis Date    Hypertension          History reviewed. No pertinent surgical history.     No current facility-administered medications on file prior to encounter.             Current Outpatient Medications on File Prior to Encounter   Medication Sig Dispense Refill    allopurinoL (ZYLOPRIM) 300 MG tablet Take 300 mg by mouth once daily.        FLUoxetine 20 MG tablet Take 20 mg by mouth once daily.        lisinopriL-hydrochlorothiazide (PRINZIDE,ZESTORETIC) 20-12.5 mg per tablet Take 1 tablet by mouth once daily.        metoprolol succinate (TOPROL-XL) 25 MG 24 hr tablet Take 25 mg by mouth once daily.        HYDROcodone-acetaminophen (NORCO) 5-325 mg per tablet Take 1 tablet by mouth every 6 (six) hours as needed for Pain. (Patient not taking: Reported on 9/29/2023) 12 tablet 0               Lab Results   Component Value Date     WBC 10.76 09/05/2023     HGB 12.5 (L) 09/05/2023     HCT 38.0 (L) 09/05/2023      09/05/2023     ALT 22 09/02/2023     AST 23 09/02/2023      09/05/2023     K 3.8 09/05/2023     CREATININE 1.29 (H) 09/05/2023     BUN 9.3 09/05/2023     CO2 26 09/05/2023     INR 1.0 09/02/2023     HGBA1C 5.9 09/02/2023               Pre-op Assessment:  abdomen is soft and benign        Review of Systems:  multiple BM's     A/P:  colonoscopy today for screening and diverticulitis

## 2023-10-13 NOTE — ANESTHESIA POSTPROCEDURE EVALUATION
Anesthesia Post Evaluation    Patient: Jaron Downing    Procedure(s) Performed: Procedure(s) (LRB):  COLONOSCOPY, WITH POLYPECTOMY USING SNARE (N/A)    Final Anesthesia Type: general      Patient location during evaluation: GI PACU  Patient participation: Yes- Able to Participate  Level of consciousness: awake and alert  Post-procedure vital signs: reviewed and stable  Pain management: adequate  Airway patency: patent    PONV status at discharge: No PONV  Anesthetic complications: no      Cardiovascular status: hemodynamically stable  Respiratory status: unassisted, spontaneous ventilation and room air  Hydration status: euvolemic  Follow-up not needed.          Vitals Value Taken Time   /84 10/13/23 0733   Temp 36.5 °C (97.7 °F) 10/13/23 0733   Pulse 85 10/13/23 0733   Resp 18 10/13/23 0733   SpO2 97 % 10/13/23 0733         No case tracking events are documented in the log.      Pain/Jorden Score: No data recorded

## 2023-10-13 NOTE — TRANSFER OF CARE
Anesthesia Transfer of Care Note    Patient: Jaron Downing    Procedure(s) Performed: Procedure(s) (LRB):  COLONOSCOPY, WITH POLYPECTOMY USING SNARE (N/A)    Patient location: GI    Anesthesia Type: general    Post pain: adequate analgesia    Post assessment: no apparent anesthetic complications    Post vital signs: stable    Level of consciousness: awake    Nausea/Vomiting: no nausea/vomiting    Complications: none    Transfer of care protocol was followedComments: Report to AURELIANO Shafer RN      Last vitals:   p78 r13 sat 98 fm t36 115/88

## 2023-10-19 LAB
DHEA SERPL-MCNC: NORMAL
ESTRIOL SERPL-MCNC: NORMAL NG/ML
ESTROGEN SERPL-MCNC: NORMAL PG/ML
INSULIN SERPL-ACNC: NORMAL U[IU]/ML
LAB AP CLINICAL INFORMATION: NORMAL
LAB AP GROSS DESCRIPTION: NORMAL
LAB AP REPORT FOOTNOTES: NORMAL
LAB AP SYNOPTIC CHECKLIST: NORMAL
T3RU NFR SERPL: NORMAL %

## 2023-11-01 LAB — BEAKER SEE SCANNED REPORT: NORMAL

## 2023-11-09 ENCOUNTER — TELEPHONE (OUTPATIENT)
Dept: ENDOSCOPY | Facility: HOSPITAL | Age: 58
End: 2023-11-09

## 2023-11-15 DIAGNOSIS — Z12.11 SCREEN FOR COLON CANCER: ICD-10-CM

## 2023-11-15 DIAGNOSIS — C18.9 ADENOCARCINOMA OF COLON: Primary | ICD-10-CM

## 2023-11-15 RX ORDER — POLYETHYLENE GLYCOL 3350, SODIUM SULFATE, SODIUM CHLORIDE, POTASSIUM CHLORIDE, SODIUM ASCORBATE, AND ASCORBIC ACID 7.5-2.691G
KIT ORAL
Qty: 1 KIT | Refills: 0 | Status: SHIPPED | OUTPATIENT
Start: 2023-11-15

## 2023-11-15 NOTE — TELEPHONE ENCOUNTER
----- Message from Amarilys Cullen MD sent at 11/6/2023  2:34 PM CST -----  Regarding: RE: Pathology - Dr. Bailey  I spoke with patient.  Please schedule him for a repeat colonoscopy to review polypectomy sites before the end of the year.  Thanks.   ----- Message -----  From: Gina Jain RN  Sent: 10/31/2023   7:17 AM CST  To: Amarilys Cullen MD  Subject: Pathology - Dr. Bailey                        Good morning,    Final Diagnosis     1. Proximal sigmoid polyp, polypectomy:   - Adenomatous polyp(s).  - No evidence of malignancy or high grade dysplasia.            2. 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   - The surgical margin is negative for carcinoma.  See CAP protocol.        3. Upper rectum, biopsy:   - Adenomatous polyp.  - No evidence of malignancy or high grade dysplasia.        This is the pathology results for this patient with a colonoscopy for Dr. Bailey. Could you please let me know what we would need to do further? Dr. Bailey will not be here for the rest of the year as of now.

## 2023-12-08 ENCOUNTER — TELEPHONE (OUTPATIENT)
Dept: ENDOSCOPY | Facility: HOSPITAL | Age: 58
End: 2023-12-08

## 2023-12-08 NOTE — TELEPHONE ENCOUNTER
Called patient to confirm colonoscopy for 12/11/23. Patient states he wants to cancel. Explained to patient that the reason for the procedure is to further evaluate from his previous abnormal colonoscopy. Patient states he does not have the funds to do a second procedure. Encouraged patient to see our financial assistance department but states that he has tried that already and does not qualify. Strongly encouraged patient to complete procedure but states he will not. Encouraged that he only has to call if his financial situation would change in the future, he wants to have the procedure, and he would be placed on the schedule as soon as possible for a colonoscopy. Patient verbalized understanding.

## (undated) DEVICE — KIT SURGICAL COLON .25 1.1OZ

## (undated) DEVICE — ELECTRODE PATIENT RETURN DISP

## (undated) DEVICE — Device

## (undated) DEVICE — MANIFOLD 4 PORT

## (undated) DEVICE — TRAP ETRAP POLYP 50 TRAY

## (undated) DEVICE — SOL IRRI STRL WATER 1000ML